# Patient Record
Sex: FEMALE | Race: WHITE | NOT HISPANIC OR LATINO | Employment: UNEMPLOYED | URBAN - METROPOLITAN AREA
[De-identification: names, ages, dates, MRNs, and addresses within clinical notes are randomized per-mention and may not be internally consistent; named-entity substitution may affect disease eponyms.]

---

## 2021-05-26 ENCOUNTER — EVALUATION (OUTPATIENT)
Dept: PHYSICAL THERAPY | Facility: CLINIC | Age: 26
End: 2021-05-26
Payer: COMMERCIAL

## 2021-05-26 DIAGNOSIS — M76.62 TENDONITIS, ACHILLES, LEFT: ICD-10-CM

## 2021-05-26 DIAGNOSIS — M79.672 LEFT FOOT PAIN: Primary | ICD-10-CM

## 2021-05-26 DIAGNOSIS — M72.2 PLANTAR FASCIITIS OF LEFT FOOT: ICD-10-CM

## 2021-05-26 PROCEDURE — 97163 PT EVAL HIGH COMPLEX 45 MIN: CPT | Performed by: PHYSICAL THERAPIST

## 2021-05-26 NOTE — PROGRESS NOTES
PT Evaluation     Today's date: 2021  Patient name: Veto Mattson  : 1995  MRN: [id number]  Referring provider: Luz Conley DPM  Dx:   Encounter Diagnosis     ICD-10-CM    1  Left foot pain  M79 672    2  Plantar fasciitis of left foot  M72 2    3  Tendonitis, Achilles, left  M76 62        Start Time: 1315  Stop Time: 1400  Total time in clinic (min): 45 minutes    Assessment  Assessment details: Patient is a 31 y o  female who presents to physical therapy with physician diagnosis of Left foot pain  (primary encounter diagnosis) Plantar fasciitis of left foot  Tendonitis, Achilles, left  PT contacted referring physician to discuss evaluation findings and will hold until after specialist consultation or further imaging has been performed  Subjective Evaluation    History of Present Illness  Mechanism of injury: Patient presents to PT wearing a walking boot on the left foot after being diagnosed with plantar fascitis and achilles tendonitis  She received an injection at the end of April but symptoms worsened  MRI was ordered and indicated revealed tendonitis  Currently she is toe walking and is not able to ambulate without there boot  She is currently employed at [employer] but has not been working due to the pandemic  Recurrent probem    Quality of life: good    Pain  Current pain ratin  At best pain ratin  At worst pain ratin          Objective     General Comments: Ankle/Foot Comments   (-) Ligamentous stress tests  (-) ttp heel, achilles tendon and plantar fascia  AROM/PROM WNL  Toe touch weight bearing only  Lacking 20-30 knee extension with painful end feel  Pain at end rage knee flexion  ttp joint line  No observable effusion of the knee  Unable to assess LLE neural tenison  No special tests performed   Hip A/PROM WNL  (-) hip scowers test               Precautions: down syndrome, portal vein DVT chronic, celiac disease, hypothyroidism   Gall bladder removal at age 14        Manuals                                                                 Neuro Re-Ed                                                                                                        Ther Ex                                                                                                                     Ther Activity                                       Gait Training                                       Modalities

## 2021-05-26 NOTE — LETTER
May 27, 2021    Jonyia Anila Miller [address] [place] [id number]    Patient: Rochelle Medrano   YOB: 1995   Date of Visit: 2021     Encounter Diagnosis     ICD-10-CM    1  Left foot pain  M79 672    2  Plantar fasciitis of left foot  M72 2    3  Tendonitis, Achilles, left  M76 62        Dear Dr Devorah Marrero: Thank you for your recent referral of Rochelle Medrano  Please review the attached evaluation summary from Anne's recent visit  Please verify that you agree with the plan of care by signing the attached order  If you have any questions or concerns, please do not hesitate to call  I sincerely appreciate the opportunity to share in the care of one of your patients and hope to have another opportunity to work with you in the near future  Sincerely,    Lindsay Brennan, PT      Referring Provider:      I certify that I have read the below Plan of Care and certify the need for these services furnished under this plan of treatment while under my care  Anila Chavez [address]  Via Fax: [phone]          PT Evaluation     Today's date: 2021  Patient name: Rochelle Medrano  : 1995  MRN: [id number]  Referring provider: Paula Lawson DPM  Dx:   Encounter Diagnosis     ICD-10-CM    1  Foot pain, bilateral  M79 671     [id number]                   Assessment  Assessment details: Patient is a 31 y o  female who presents to physical therapy with physician diagnosis of Left foot pain  (primary encounter diagnosis) Plantar fasciitis of left foot  Tendonitis, Achilles, left  PT contacted referring physician to discuss evaluation findings and will hold until after specialist consultation or further imaging has been performed          Subjective Evaluation    History of Present Illness  Mechanism of injury: Patient presents to PT wearing a walking boot on the left foot after being diagnosed with plantar fascitis and achilles tendonitis  She received an injection at the end of April but symptoms worsened  MRI was ordered and indicated revealed tendonitis  Currently she is toe walking and is not able to ambulate without there boot  She is currently employed at [employer] but has not been working due to the pandemic  Recurrent probem    Quality of life: good    Pain  Current pain ratin  At best pain ratin  At worst pain ratin          Objective     General Comments: Ankle/Foot Comments   (-) Ligamentous stress tests  (-) ttp heel, achilles tendon and plantar fascia  AROM/PROM WNL  Toe touch weight bearing only  Lacking 20-30 knee extension with painful end feel  Pain at end rage knee flexion  ttp joint line  No observable effusion of the knee  Unable to assess LLE neural tenison  No special tests performed   Hip A/PROM WNL  (-) hip scowers test               Precautions: down syndrome, portal vein DVT chronic, celiac disease, hypothyroidism  Gall bladder removal at age 14        Manuals                                                                 Neuro Re-Ed                                                                                                        Ther Ex                                                                                                                     Ther Activity                                       Gait Training                                       Modalities

## 2021-05-27 ENCOUNTER — TRANSCRIBE ORDERS (OUTPATIENT)
Dept: PHYSICAL THERAPY | Facility: CLINIC | Age: 26
End: 2021-05-27

## 2021-05-27 DIAGNOSIS — M76.62 TENDONITIS, ACHILLES, LEFT: ICD-10-CM

## 2021-05-27 DIAGNOSIS — M79.672 LEFT FOOT PAIN: Primary | ICD-10-CM

## 2021-05-27 DIAGNOSIS — M72.2 PLANTAR FASCIAL FIBROMATOSIS: ICD-10-CM

## 2021-06-07 ENCOUNTER — APPOINTMENT (OUTPATIENT)
Dept: RADIOLOGY | Facility: MEDICAL CENTER | Age: 26
End: 2021-06-07
Payer: COMMERCIAL

## 2021-06-07 ENCOUNTER — OFFICE VISIT (OUTPATIENT)
Dept: OBGYN CLINIC | Facility: MEDICAL CENTER | Age: 26
End: 2021-06-07
Payer: COMMERCIAL

## 2021-06-07 VITALS
TEMPERATURE: 98 F | SYSTOLIC BLOOD PRESSURE: 109 MMHG | HEART RATE: 86 BPM | BODY MASS INDEX: 40.13 KG/M2 | WEIGHT: 186 LBS | DIASTOLIC BLOOD PRESSURE: 71 MMHG | HEIGHT: 57 IN

## 2021-06-07 DIAGNOSIS — M25.562 LEFT KNEE PAIN, UNSPECIFIED CHRONICITY: ICD-10-CM

## 2021-06-07 DIAGNOSIS — M25.562 LEFT KNEE PAIN, UNSPECIFIED CHRONICITY: Primary | ICD-10-CM

## 2021-06-07 DIAGNOSIS — M89.9 BONE LESION: ICD-10-CM

## 2021-06-07 PROCEDURE — 73562 X-RAY EXAM OF KNEE 3: CPT

## 2021-06-07 PROCEDURE — 99203 OFFICE O/P NEW LOW 30 MIN: CPT | Performed by: ORTHOPAEDIC SURGERY

## 2021-06-07 NOTE — PROGRESS NOTES
Assessment/Plan     1  Left knee pain, unspecified chronicity    2  Bone lesion      Orders Placed This Encounter   Procedures    XR knee 3 vw left non injury    MRI knee left  wo contrast     · Patient has irregularity of the cortex left proximal tibia   · Will be ordering MRI left knee to further evaluate this  · Ice as needed for pain   · May take Tylenol 1000 mg every 8 hours as needed for pain  Do not exceed 3000 mg a day   Return for Discuss MRI left knee   I answered all of the patient's questions during the visit and provided education of the patient's condition during the visit  The patient verbalized understanding of the information given and agrees with the plan  This note was dictated using MEDOP software  It may contain errors including improperly dictated words  Please contact physician directly for any questions  History of Present Illness   Chief complaint:   Chief Complaint   Patient presents with    Left Knee - Pain       HPI: Santhosh Taylor is a 31 y o  female that c/o left knee pain  She was referred by her podiatrist Dr Bernice Ventura  She is present today with mother  Patient has history of Down syndrome  Per her mother she has been treating with a podiatrist the last two months for left plantar fasciitis and had steroid injection, was wearing a Cam boot and was referred to physical therapy  During her physical therapy sessions, the physical therapist noticed  she had trouble extending her left knee  Patient does limp when walking and has trouble standing  Pain is over the anteromedial aspect of the left knee with twisting  Per her mother she had fall a week ago  Patient is currently not taking pain medications  Patient cannot take NSAIDs due to being on Eliquis for blood clots  It is she is using a cane when walking  She has no history having injections, physical therapy or surgeries on the left knee  ROS:    See HPI for musculoskeletal review     All other systems reviewed are negative     Historical Information   No past medical history on file  No past surgical history on file  Social History   Social History     Substance and Sexual Activity   Alcohol Use Not on file     Social History     Substance and Sexual Activity   Drug Use Not on file     Social History     Tobacco Use   Smoking Status Never Smoker   Smokeless Tobacco Never Used     Family History: No family history on file  No current outpatient medications on file prior to visit  No current facility-administered medications on file prior to visit  Not on File    No current outpatient medications on file prior to visit  No current facility-administered medications on file prior to visit  Objective   Vitals: Blood pressure 109/71, pulse 86, temperature 98 °F (36 7 °C), height 4' 9" (1 448 m), weight 84 4 kg (186 lb)  ,Body mass index is 40 25 kg/m²      PE:  AAOx 3  WDWN  Hearing intact, no drainage from eyes  Regular rate  no audible wheezing  no abdominal distension  LE compartments soft, skin intact    leftknee:    Appearance:  no swelling   No ecchymosis  no obvious joint deformity   No effusion  Palpation/Tenderness:  No TTP over medial joint line  No TTP over lateral joint line   No TTP over patella  No TTP over patellar tendon  No TTP over pes anserine bursa  Active Range of Motion:  PROM:   Special Tests:  Medial Adolfo's Test:  Unable to exam   Lateral Adolfo's Test:  Unable to exam   Apley's compression test:  Negative  Lachman's Test:  Unable to exam   Anterior Drawer Test:  Negative  Patellar grind:  Negative  Valgus Stress Test:  Negative at 50 degrees   Varus Stress Test:  negative at  50 degrees     No ipsilateral hip pain with ROM    leftLE:    Sensation grossly intact L4, S1   Palpable posterior tibial  pulse  AT/GS  intact    Imaging Studies: I have personally reviewed pertinent films in PACS   XR leftknee:  Irregularity noted on the lateral cortex of the proximal tibia        Scribe Attestation    I,:  Yash Guevara am acting as a scribe while in the presence of the attending physician :       I,:  Piper Amador, DO personally performed the services described in this documentation    as scribed in my presence :

## 2021-06-15 ENCOUNTER — HOSPITAL ENCOUNTER (OUTPATIENT)
Dept: MRI IMAGING | Facility: HOSPITAL | Age: 26
Discharge: HOME | End: 2021-06-15
Attending: ORTHOPAEDIC SURGERY

## 2021-06-15 DIAGNOSIS — M89.9 BONE LESION: ICD-10-CM

## 2021-06-15 DIAGNOSIS — M25.562 LEFT KNEE PAIN, UNSPECIFIED CHRONICITY: ICD-10-CM

## 2021-06-16 ENCOUNTER — TELEPHONE (OUTPATIENT)
Dept: OBGYN CLINIC | Facility: HOSPITAL | Age: 26
End: 2021-06-16

## 2021-06-16 DIAGNOSIS — M25.562 LEFT KNEE PAIN, UNSPECIFIED CHRONICITY: Primary | ICD-10-CM

## 2021-06-16 DIAGNOSIS — M89.9 BONE LESION: ICD-10-CM

## 2021-06-16 NOTE — TELEPHONE ENCOUNTER
Johan Kothari,     Can you please call the patients mother to reschedule her at the locations offering the larger MRI machine  Thank you!

## 2021-06-16 NOTE — TELEPHONE ENCOUNTER
Patients mother [place] states that her MRI is scheduled for [facility]  Can you please authorize this?       NPI# [id number]  # [phone]

## 2021-06-16 NOTE — TELEPHONE ENCOUNTER
Patient sees Dr Willson Roles  Patients mother Mary Brooks is calling in stating that she was scheduled to have an MRI yesterday but was unable to do it  She stated that her leg was too swollen for them to close the enclosure they need to get her leg in  They advised that Cleo Adler or Abi Ramírez might be able to have a machine that would be able to help her  Mom is asking if anyway they are able to get this done as soon as possible, and for a call back relating this          Call back# [phone]

## 2021-06-17 ENCOUNTER — TELEPHONE (OUTPATIENT)
Dept: OBGYN CLINIC | Facility: HOSPITAL | Age: 26
End: 2021-06-17

## 2021-06-17 ENCOUNTER — HOSPITAL ENCOUNTER (OUTPATIENT)
Dept: MRI IMAGING | Facility: HOSPITAL | Age: 26
Discharge: HOME | End: 2021-06-17
Payer: COMMERCIAL

## 2021-06-17 DIAGNOSIS — M25.562 LEFT KNEE PAIN, UNSPECIFIED CHRONICITY: ICD-10-CM

## 2021-06-17 DIAGNOSIS — M89.9 BONE LESION: ICD-10-CM

## 2021-06-17 PROCEDURE — 73721 MRI JNT OF LWR EXTRE W/O DYE: CPT

## 2021-06-17 PROCEDURE — G1004 CDSM NDSC: HCPCS

## 2021-06-17 NOTE — TELEPHONE ENCOUNTER
Patient sees Dr Bailey Wright    Patient had an MRI scheduled for today and it was unsuccessful  Patient was in pain at every attempt to fully extend her leg, making the patient unable to complete the MRI  Patient's mother called to see what Dr Bailey Wright would recommend they do now      Call back # [phone]

## 2021-06-17 NOTE — TELEPHONE ENCOUNTER
Patient's mom called stating she doesn't know what to do now  She stated that the MRI was failed again  She stated that the patient was in a lot of pain and could not sit still  She is asking what is next  She is asking if she needs to keep the appointment for Monday even though the MRI wasn't done just to come in and speak to someone about next steps  Please advise

## 2021-06-17 NOTE — TELEPHONE ENCOUNTER
MRI called as well to let Dr Charmaine Ma that they were only able to get 2 scans out of 6 and they are very blurry and undiagnosable due to patient movement due to pain    Please advise

## 2021-06-18 NOTE — TELEPHONE ENCOUNTER
Spoke with patient's mother  She explained that Brayan Case was unable to tolerate MRI  I let her know that Dr Cash Dumont is out of the office until Monday  We will review Anne's case and come up with a plan for her and call on Monday morning  Patient does have an appointment on Monday  I will keep this scheduled for now, but likely Dr Cash Dumont will not need to see Brayan Dick without imaging to review yet

## 2021-06-21 DIAGNOSIS — M89.9 BONE LESION: ICD-10-CM

## 2021-06-21 DIAGNOSIS — M25.562 ACUTE PAIN OF LEFT KNEE: Primary | ICD-10-CM

## 2021-06-21 NOTE — TELEPHONE ENCOUNTER
MRI order placed to complete with sedation  Explained to [facility], patient's mother, that this will have to be done at [facility] so they can have anesthesia present  Order placed  We will follow up once results are in

## 2021-06-21 NOTE — TELEPHONE ENCOUNTER
Spoke with patients mother  Appt for 6/21 cancelled  We will discuss further imaging and call patients mother to update her

## 2021-06-23 ENCOUNTER — ANESTHESIA EVENT (OUTPATIENT)
Dept: RADIOLOGY | Facility: HOSPITAL | Age: 26
End: 2021-06-23

## 2021-06-23 RX ORDER — LEVOTHYROXINE SODIUM 0.12 MG/1
125 TABLET ORAL DAILY
COMMUNITY
Start: 2021-05-26

## 2021-06-23 RX ORDER — SPIRONOLACTONE 50 MG/1
50 TABLET, FILM COATED ORAL DAILY
COMMUNITY

## 2021-06-23 RX ORDER — IVERMECTIN 10 MG/G
CREAM TOPICAL
COMMUNITY

## 2021-06-23 RX ORDER — CETIRIZINE HYDROCHLORIDE 10 MG/1
10 TABLET ORAL DAILY
COMMUNITY

## 2021-06-23 RX ORDER — APIXABAN 2.5 MG/1
2.5 TABLET, FILM COATED ORAL 2 TIMES DAILY
COMMUNITY
Start: 2021-05-21

## 2021-06-23 RX ORDER — AZELASTINE HCL 205.5 UG/1
SPRAY NASAL
COMMUNITY

## 2021-06-23 RX ORDER — CLINDAMYCIN PHOSPHATE 10 UG/ML
LOTION TOPICAL
COMMUNITY

## 2021-06-23 RX ORDER — PANTOPRAZOLE SODIUM 40 MG/1
40 TABLET, DELAYED RELEASE ORAL 2 TIMES DAILY
COMMUNITY
Start: 2021-05-21

## 2021-06-23 RX ORDER — FERROUS SULFATE TAB EC 324 MG (65 MG FE EQUIVALENT) 324 (65 FE) MG
TABLET DELAYED RESPONSE ORAL
COMMUNITY

## 2021-06-23 RX ORDER — ADAPALENE 0.1 G/100G
1 CREAM TOPICAL
COMMUNITY
Start: 2021-05-24

## 2021-06-23 RX ORDER — TRIAMCINOLONE ACETONIDE 1 MG/G
CREAM TOPICAL 2 TIMES DAILY
COMMUNITY

## 2021-06-23 NOTE — PRE-PROCEDURE INSTRUCTIONS
Pre-Surgery Instructions:   Medication Instructions    adapalene (DIFFERIN) 0 1 % cream Instructed patient per Anesthesia Guidelines   Azelastine HCl 0 15 % SOLN Instructed patient per Anesthesia Guidelines   cetirizine (ZyrTEC) 10 mg tablet Instructed patient per Anesthesia Guidelines   clindamycin (CLEOCIN T) 1 % lotion Instructed patient per Anesthesia Guidelines   Doxycycline Hyclate 120 MG TBEC Instructed patient per Anesthesia Guidelines   Eliquis 2 5 MG Instructed patient per Anesthesia Guidelines   ferrous sulfate 324 (65 Fe) mg Instructed patient per Anesthesia Guidelines   Ivermectin (Soolantra) 1 % CREA Instructed patient per Anesthesia Guidelines   levothyroxine 125 mcg tablet Instructed patient per Anesthesia Guidelines   pantoprazole (PROTONIX) 40 mg tablet Instructed patient per Anesthesia Guidelines   spironolactone (ALDACTONE) 50 mg tablet Instructed patient per Anesthesia Guidelines  HOLD 6/28    triamcinolone (KENALOG) 0 1 % cream Instructed patient per Anesthesia Guidelines  Pre Procedure instructions given and verbalized understanding to mother  Hold Aldactone 6/28  Morning meds with water

## 2021-06-28 ENCOUNTER — TELEPHONE (OUTPATIENT)
Dept: OBGYN CLINIC | Facility: MEDICAL CENTER | Age: 26
End: 2021-06-28

## 2021-06-28 ENCOUNTER — ANESTHESIA (OUTPATIENT)
Dept: RADIOLOGY | Facility: HOSPITAL | Age: 26
End: 2021-06-28

## 2021-06-28 ENCOUNTER — HOSPITAL ENCOUNTER (OUTPATIENT)
Dept: RADIOLOGY | Facility: HOSPITAL | Age: 26
Discharge: HOME | End: 2021-06-28
Payer: COMMERCIAL

## 2021-06-28 VITALS
HEIGHT: 57 IN | TEMPERATURE: 96.9 F | WEIGHT: 180 LBS | OXYGEN SATURATION: 97 % | RESPIRATION RATE: 16 BRPM | HEART RATE: 66 BPM | DIASTOLIC BLOOD PRESSURE: 59 MMHG | BODY MASS INDEX: 38.83 KG/M2 | SYSTOLIC BLOOD PRESSURE: 107 MMHG

## 2021-06-28 DIAGNOSIS — M25.662 STIFFNESS OF LEFT KNEE: Primary | ICD-10-CM

## 2021-06-28 DIAGNOSIS — M89.9 BONE LESION: ICD-10-CM

## 2021-06-28 DIAGNOSIS — M25.562 ACUTE PAIN OF LEFT KNEE: ICD-10-CM

## 2021-06-28 LAB
EXT PREGNANCY TEST URINE: NEGATIVE
EXT. CONTROL: NORMAL

## 2021-06-28 PROCEDURE — 81025 URINE PREGNANCY TEST: CPT | Performed by: ANESTHESIOLOGY

## 2021-06-28 PROCEDURE — G1004 CDSM NDSC: HCPCS

## 2021-06-28 PROCEDURE — 73721 MRI JNT OF LWR EXTRE W/O DYE: CPT

## 2021-06-28 RX ORDER — SODIUM CHLORIDE, SODIUM LACTATE, POTASSIUM CHLORIDE, CALCIUM CHLORIDE 600; 310; 30; 20 MG/100ML; MG/100ML; MG/100ML; MG/100ML
20 INJECTION, SOLUTION INTRAVENOUS CONTINUOUS
Status: DISCONTINUED | OUTPATIENT
Start: 2021-06-28 | End: 2021-06-29 | Stop reason: HOSPADM

## 2021-06-28 RX ORDER — ONDANSETRON 2 MG/ML
INJECTION INTRAMUSCULAR; INTRAVENOUS AS NEEDED
Status: DISCONTINUED | OUTPATIENT
Start: 2021-06-28 | End: 2021-06-28

## 2021-06-28 RX ORDER — LIDOCAINE HYDROCHLORIDE 10 MG/ML
0.5 INJECTION, SOLUTION EPIDURAL; INFILTRATION; INTRACAUDAL; PERINEURAL ONCE AS NEEDED
Status: DISCONTINUED | OUTPATIENT
Start: 2021-06-28 | End: 2021-06-29 | Stop reason: HOSPADM

## 2021-06-28 RX ORDER — LIDOCAINE HYDROCHLORIDE 10 MG/ML
INJECTION, SOLUTION EPIDURAL; INFILTRATION; INTRACAUDAL; PERINEURAL AS NEEDED
Status: DISCONTINUED | OUTPATIENT
Start: 2021-06-28 | End: 2021-06-28

## 2021-06-28 RX ORDER — DEXAMETHASONE SODIUM PHOSPHATE 10 MG/ML
INJECTION, SOLUTION INTRAMUSCULAR; INTRAVENOUS AS NEEDED
Status: DISCONTINUED | OUTPATIENT
Start: 2021-06-28 | End: 2021-06-28

## 2021-06-28 RX ORDER — EPHEDRINE SULFATE 50 MG/ML
INJECTION INTRAVENOUS AS NEEDED
Status: DISCONTINUED | OUTPATIENT
Start: 2021-06-28 | End: 2021-06-28

## 2021-06-28 RX ORDER — SODIUM CHLORIDE, SODIUM LACTATE, POTASSIUM CHLORIDE, CALCIUM CHLORIDE 600; 310; 30; 20 MG/100ML; MG/100ML; MG/100ML; MG/100ML
INJECTION, SOLUTION INTRAVENOUS CONTINUOUS PRN
Status: DISCONTINUED | OUTPATIENT
Start: 2021-06-28 | End: 2021-06-28

## 2021-06-28 RX ORDER — PROPOFOL 10 MG/ML
INJECTION, EMULSION INTRAVENOUS AS NEEDED
Status: DISCONTINUED | OUTPATIENT
Start: 2021-06-28 | End: 2021-06-28

## 2021-06-28 RX ADMIN — ONDANSETRON 4 MG: 2 INJECTION INTRAMUSCULAR; INTRAVENOUS at 12:51

## 2021-06-28 RX ADMIN — SODIUM CHLORIDE, SODIUM LACTATE, POTASSIUM CHLORIDE, AND CALCIUM CHLORIDE 20 ML/HR: .6; .31; .03; .02 INJECTION, SOLUTION INTRAVENOUS at 11:50

## 2021-06-28 RX ADMIN — DEXAMETHASONE SODIUM PHOSPHATE 10 MG: 10 INJECTION, SOLUTION INTRAMUSCULAR; INTRAVENOUS at 12:51

## 2021-06-28 RX ADMIN — PROPOFOL 150 MG: 10 INJECTION, EMULSION INTRAVENOUS at 12:45

## 2021-06-28 RX ADMIN — SODIUM CHLORIDE, SODIUM LACTATE, POTASSIUM CHLORIDE, AND CALCIUM CHLORIDE: .6; .31; .03; .02 INJECTION, SOLUTION INTRAVENOUS at 12:54

## 2021-06-28 RX ADMIN — LIDOCAINE HYDROCHLORIDE 50 MG: 10 INJECTION, SOLUTION EPIDURAL; INFILTRATION; INTRACAUDAL; PERINEURAL at 12:45

## 2021-06-28 RX ADMIN — EPHEDRINE SULFATE 5 MG: 50 INJECTION, SOLUTION INTRAVENOUS at 13:12

## 2021-06-28 RX ADMIN — EPHEDRINE SULFATE 5 MG: 50 INJECTION, SOLUTION INTRAVENOUS at 12:54

## 2021-06-28 NOTE — ANESTHESIA PREPROCEDURE EVALUATION
Procedure:  MRI KNEE LEFT WO CONTRAST    Relevant Problems   No relevant active problems   Hx of Downs syndrome   ASD s/p repair            Anesthesia Plan  ASA Score- 3     Anesthesia Type- general with ASA Monitors  Additional Monitors:   Airway Plan: LMA  Plan Factors-Exercise tolerance (METS): >4 METS  Chart reviewed  EKG reviewed  Imaging results reviewed  Existing labs reviewed  Patient summary reviewed  Induction- intravenous  Postoperative Plan- Plan for postoperative opioid use  Planned trial extubation    Informed Consent- Anesthetic plan and risks discussed with patient  I personally reviewed this patient with the CRNA  Discussed and agreed on the Anesthesia Plan with the CRNA  Gopi Rojas

## 2021-06-28 NOTE — NURSING NOTE
MRI completed, patient tolerated procedure  Post vital signs taken and recorded  Report given to  [facility]  Patient placed in transport to be taken to [address]  Patient offers no complaints or verbalizing any issues upon leaving MRI with Mother at side

## 2021-06-28 NOTE — TELEPHONE ENCOUNTER
Spoke with patient's mother Woodward Dancer  MRI was normal, with small joint effusion  Patient may have developed arthrofibrosis due to previous injury  We recommend PT and following up with Dr Slime Blank in several weeks  New PT script provided  Referral placed for No Fletcher had no other questions at this time

## 2021-06-28 NOTE — ANESTHESIA POSTPROCEDURE EVALUATION
Post-Op Assessment Note    CV Status:  Stable  Pain Score: 0    Pain management: adequate     Mental Status:  Alert   Hydration Status:  Stable   PONV Controlled:  None   Airway Patency:  Patent      Post Op Vitals Reviewed: Yes      Staff: CRNA         No complications documented      BP   116/64   Temp   97 6   Pulse  68   Resp   14   SpO2   98%

## 2021-06-30 ENCOUNTER — EVALUATION (OUTPATIENT)
Dept: PHYSICAL THERAPY | Facility: CLINIC | Age: 26
End: 2021-06-30
Payer: COMMERCIAL

## 2021-06-30 DIAGNOSIS — M25.562 ACUTE PAIN OF LEFT KNEE: ICD-10-CM

## 2021-06-30 DIAGNOSIS — M25.662 STIFFNESS OF LEFT KNEE: Primary | ICD-10-CM

## 2021-06-30 PROCEDURE — 97110 THERAPEUTIC EXERCISES: CPT | Performed by: PHYSICAL THERAPIST

## 2021-06-30 PROCEDURE — 97140 MANUAL THERAPY 1/> REGIONS: CPT | Performed by: PHYSICAL THERAPIST

## 2021-06-30 PROCEDURE — 97162 PT EVAL MOD COMPLEX 30 MIN: CPT | Performed by: PHYSICAL THERAPIST

## 2021-06-30 NOTE — PROGRESS NOTES
PT Evaluation     Today's date: 2021  Patient name: Radha Andrea  : 1995  MRN: [id number]  Referring provider: Adria Wong PA-C  Dx:   Encounter Diagnosis     ICD-10-CM    1  Stiffness of left knee  M25 662 Ambulatory referral to Physical Therapy   2  Acute pain of left knee  M25 562 Ambulatory referral to Physical Therapy                  Assessment  Assessment details: Radha Andrea is a 31y o  year old female presenting to PT with decreased range of motion, decreased strength, and decreased tolerance to activity  The existing impairments result in difficulty weight bearing on the LLE  She was found with Achilles tendonitis on imaging, related to forefoot weightbearing  Knee MRI revealed no abnormalities  She is noted with increased tone throughout hamstring and gastroc today  She is able to increase knee extension following MT and is noted with near flat foot weight bearing by end of session  Lloyd Galvez would benefit from skilled PT services to address these issues and to maximize function  Home exercise provided and all questions answered  Thank you for the referral     Impairments: abnormal or restricted ROM, activity intolerance and impaired physical strength  Understanding of Dx/Px/POC: good   Prognosis: good    Plan  Planned modality interventions: thermotherapy: hydrocollator packs  Planned therapy interventions: joint mobilization, manual therapy, neuromuscular re-education, strengthening, stretching, therapeutic activities and therapeutic exercise  Frequency: 2x week  Duration in weeks: 6        Subjective Evaluation    History of Present Illness  Mechanism of injury: Lloyd Galvez returns to clinic with her mother for follow up evaluation for left knee pain  MRI revealed no abnormalities in the knee  She continues to rely on [employer] for support while walking due to feeling that the knee might buckle on her  Swelling has largely resolved    Patient reports decrease in feelings of discomfort around the left knee     Pain  Relieving factors: rest and support  Aggravating factors: walking  Progression: improved      Diagnostic Tests  MRI studies: normal  Treatments  Current treatment: physical therapy  Patient Goals  Patient goals for therapy: decreased pain, increased motion, return to sport/leisure activities, independence with ADLs/IADLs, increased strength and return to work          Objective     Active Range of Motion   Left Knee   Flexion: 108 degrees   Extension: -33 degrees     Strength/Myotome Testing     Left Knee   Flexion: 4+  Extension: 4+    No tenderness to p         Precautions: Down's Syndrome      Manuals 6/30            Prone achilles STM JL                                                   Neuro Re-Ed                                                                                                        Ther Ex             Bike L1 5'            Bridge x5            SLR 5x            Heel slides 5x                                                                Ther Activity                                       Gait Training                                       Modalities

## 2021-07-06 ENCOUNTER — OFFICE VISIT (OUTPATIENT)
Dept: PHYSICAL THERAPY | Facility: CLINIC | Age: 26
End: 2021-07-06
Payer: COMMERCIAL

## 2021-07-06 DIAGNOSIS — M25.562 ACUTE PAIN OF LEFT KNEE: ICD-10-CM

## 2021-07-06 DIAGNOSIS — M25.662 STIFFNESS OF LEFT KNEE: Primary | ICD-10-CM

## 2021-07-06 PROCEDURE — 97112 NEUROMUSCULAR REEDUCATION: CPT | Performed by: PHYSICAL THERAPIST

## 2021-07-06 PROCEDURE — 97140 MANUAL THERAPY 1/> REGIONS: CPT | Performed by: PHYSICAL THERAPIST

## 2021-07-06 PROCEDURE — 97110 THERAPEUTIC EXERCISES: CPT | Performed by: PHYSICAL THERAPIST

## 2021-07-06 NOTE — PROGRESS NOTES
Daily Note     Today's date: 2021  Patient name: Pineda Hathaway  : 1995  MRN: [id number]  Referring provider: Ebony Cuellar PA-C  Dx:   Encounter Diagnosis     ICD-10-CM    1  Stiffness of left knee  M25 662    2  Acute pain of left knee  M25 562                   Subjective: Anne and her mother notice improved heel striking today, and decreased reliance on SPC at home  Jani Saldivar notices that her knee feels better while barefoot  Objective: See treatment diary below      Assessment: Tolerated treatment well and continues to benefit from MT, with continued focus on Achilles and new focus on hip rotators today  Patient exhibited good technique with therapeutic exercises and would benefit from continued PT      Plan: Continue per plan of care  Progress treatment as tolerated         Precautions: Down's Syndrome      Manuals            Prone achilles STM JL JL           Prone HS and piriformis  JL                                     Neuro Re-Ed             Jyoti  Green 2x10           HS elongation  10"x10           Supine piriformis s  30"x3                                                               Ther Ex             Bike L1 5' L1 10'           Bridge x5            SLR 5x            Heel slides 5x                                                                Ther Activity                                       Gait Training                                       Modalities

## 2021-07-08 ENCOUNTER — OFFICE VISIT (OUTPATIENT)
Dept: PHYSICAL THERAPY | Facility: CLINIC | Age: 26
End: 2021-07-08
Payer: COMMERCIAL

## 2021-07-08 DIAGNOSIS — M25.662 STIFFNESS OF LEFT KNEE: Primary | ICD-10-CM

## 2021-07-08 DIAGNOSIS — M25.562 ACUTE PAIN OF LEFT KNEE: ICD-10-CM

## 2021-07-08 PROCEDURE — 97140 MANUAL THERAPY 1/> REGIONS: CPT

## 2021-07-08 PROCEDURE — 97112 NEUROMUSCULAR REEDUCATION: CPT

## 2021-07-08 PROCEDURE — 97110 THERAPEUTIC EXERCISES: CPT

## 2021-07-08 NOTE — PROGRESS NOTES
Daily Note     Today's date: 2021  Patient name: Guillermina David  : 1995  MRN: [id number]  Referring provider: Jenny Perdomo PA-C  Dx:   Encounter Diagnosis     ICD-10-CM    1  Stiffness of left knee  M25 662    2  Acute pain of left knee  M25 562                   Subjective: Anne and her mother continue to report improvement with walking and decreased reliance on SPC at home  Shanda Hockey denies pain pre treatment  Objective: See treatment diary below      Assessment: Tolerated treatment well  Pt cont's to respond well to manual therapy with improved knee extension ROM and gait post treatment  Good tolerance to exercise program with verbal/tactile cues provided for proper technique  Patient exhibited good technique with therapeutic exercises and would benefit from continued PT      Plan: Continue per plan of care  Progress treatment as tolerated         Precautions: Down's Syndrome      Manuals           Prone achilles STM JL JL GH          Prone HS and piriformis  JL GH                                    Neuro Re-Ed             Clamshell  Green 2x10 Green 2x10 s/l          HS elongation  10"x10 10"x  10          Supine piriformis stretch  30"x3 30"x3                                                              Ther Ex             Bike L1 5' L1 10' L1 6'          Bridge x5  5"x10          SLR 5x            Heel slides 5x                                                                Ther Activity                                       Gait Training                                       Modalities

## 2021-07-13 ENCOUNTER — OFFICE VISIT (OUTPATIENT)
Dept: PHYSICAL THERAPY | Facility: CLINIC | Age: 26
End: 2021-07-13
Payer: COMMERCIAL

## 2021-07-13 DIAGNOSIS — M25.662 STIFFNESS OF LEFT KNEE: Primary | ICD-10-CM

## 2021-07-13 DIAGNOSIS — M25.562 ACUTE PAIN OF LEFT KNEE: ICD-10-CM

## 2021-07-13 PROCEDURE — 97112 NEUROMUSCULAR REEDUCATION: CPT

## 2021-07-13 PROCEDURE — 97140 MANUAL THERAPY 1/> REGIONS: CPT

## 2021-07-13 PROCEDURE — 97110 THERAPEUTIC EXERCISES: CPT

## 2021-07-13 NOTE — PROGRESS NOTES
Daily Note     Today's date: 2021  Patient name: Rosario Tracey  : 1995  MRN: [id number]  Referring provider: Betty Resendiz PA-C  Dx:   Encounter Diagnosis     ICD-10-CM    1  Stiffness of left knee  M25 662    2  Acute pain of left knee  M25 562                   Subjective: Jessa Umanzor and her mother continue to report slow improvement with her walking  Jessa Umanzor denies pain  Objective: See treatment diary below      Assessment: Tolerated treatment well  Good tolerance to progression of exercise with verbal/tactile cues provided for proper technique  Pt cont's to respond well to manual therapy and exercise with improved knee extension and heel strike with gait post treatment  Patient exhibited good technique with therapeutic exercises and would benefit from continued PT      Plan: Continue per plan of care  Progress treatment as tolerated         Precautions: Down's Syndrome      Manuals          Prone achilles STM JL JL GH GH         Prone HS and piriformis  JL GH GH                                    Neuro Re-Ed             Clamshell  Green 2x10 Green 2x10 s/l nv         HS elongation  10"x10 10"x  10 GH         Supine piriformis stretch  30"x3 30"x3 [facility]         Standing gastroc stretch/knee ext with 1/2 foam roll    10"x5         Exaggerated gait at railing (emphasize heel strike)     20 ft x2                                    Ther Ex             Bike L1 5' L1 10' L1 6' 7'         Bridge x5  5"x10          SLR 5x            Heel slides 5x            LAQ     x10 with tc's                                                Ther Activity                                       Gait Training                                       Modalities

## 2021-07-15 ENCOUNTER — OFFICE VISIT (OUTPATIENT)
Dept: PHYSICAL THERAPY | Facility: CLINIC | Age: 26
End: 2021-07-15
Payer: COMMERCIAL

## 2021-07-15 DIAGNOSIS — M25.662 STIFFNESS OF LEFT KNEE: Primary | ICD-10-CM

## 2021-07-15 DIAGNOSIS — M25.562 ACUTE PAIN OF LEFT KNEE: ICD-10-CM

## 2021-07-15 PROCEDURE — 97110 THERAPEUTIC EXERCISES: CPT | Performed by: PHYSICAL THERAPIST

## 2021-07-15 PROCEDURE — 97112 NEUROMUSCULAR REEDUCATION: CPT | Performed by: PHYSICAL THERAPIST

## 2021-07-15 PROCEDURE — 97140 MANUAL THERAPY 1/> REGIONS: CPT | Performed by: PHYSICAL THERAPIST

## 2021-07-16 NOTE — PROGRESS NOTES
Daily Note     Today's date: 2021  Patient name: Terri Cole  : 1995  MRN: [id number]  Referring provider: Bassam Dang PA-C  Dx:   Encounter Diagnosis     ICD-10-CM    1  Stiffness of left knee  M25 662    2  Acute pain of left knee  M25 562                   Subjective: Anne and her mom note improvement in knee mobility  She is relying on the [facility] less at home  Objective: See treatment diary below      Assessment: Tolerated treatment fair and noted with spacticity in the left hamstring with knee extension  The plan to follow up with PCP regarding this finding    Patient demonstrated fatigue post treatment and would benefit from continued PT      Plan: Continue per plan of care  Progress treatment as tolerated         Precautions: Down's Syndrome      Manuals 6/30 7/6 7/8 7/13 7/15        Prone achilles STM JL JL GH GH JL        Prone HS and piriformis  JL GH GH  JL                                  Neuro Re-Ed             Clamshell  Green 2x10 Green 2x10 s/l nv         HS elongation  10"x10 10"x  10 GH JL        Supine piriformis stretch  30"x3 30"x3 [address] JL        Standing gastroc stretch/knee ext with 1/2 foam roll    10"x5         Exaggerated gait at railing (emphasize heel strike)     20 ft x2                                    Ther Ex             Bike L1 5' L1 10' L1 6' 7' 6'        Bridge x5  5"x10  JL        SLR 5x            Heel slides 5x            LAQ     x10 with tc's         TM inc walk     5'                                  Ther Activity                                       Gait Training                                       Modalities

## 2021-07-20 ENCOUNTER — OFFICE VISIT (OUTPATIENT)
Dept: PHYSICAL THERAPY | Facility: CLINIC | Age: 26
End: 2021-07-20
Payer: COMMERCIAL

## 2021-07-20 DIAGNOSIS — M25.562 ACUTE PAIN OF LEFT KNEE: ICD-10-CM

## 2021-07-20 DIAGNOSIS — M25.662 STIFFNESS OF LEFT KNEE: Primary | ICD-10-CM

## 2021-07-20 PROCEDURE — 97140 MANUAL THERAPY 1/> REGIONS: CPT | Performed by: PHYSICAL THERAPIST

## 2021-07-20 PROCEDURE — 97112 NEUROMUSCULAR REEDUCATION: CPT | Performed by: PHYSICAL THERAPIST

## 2021-07-20 PROCEDURE — 97110 THERAPEUTIC EXERCISES: CPT | Performed by: PHYSICAL THERAPIST

## 2021-07-22 ENCOUNTER — OFFICE VISIT (OUTPATIENT)
Dept: PHYSICAL THERAPY | Facility: CLINIC | Age: 26
End: 2021-07-22
Payer: COMMERCIAL

## 2021-07-22 DIAGNOSIS — M25.562 ACUTE PAIN OF LEFT KNEE: ICD-10-CM

## 2021-07-22 DIAGNOSIS — M25.662 STIFFNESS OF LEFT KNEE: Primary | ICD-10-CM

## 2021-07-22 PROCEDURE — 97110 THERAPEUTIC EXERCISES: CPT | Performed by: PHYSICAL THERAPIST

## 2021-07-22 PROCEDURE — 97140 MANUAL THERAPY 1/> REGIONS: CPT | Performed by: PHYSICAL THERAPIST

## 2021-07-22 PROCEDURE — 97112 NEUROMUSCULAR REEDUCATION: CPT | Performed by: PHYSICAL THERAPIST

## 2021-07-22 NOTE — PROGRESS NOTES
Daily Note     Today's date: 2021  Patient name: Jad Marie  : 1995  MRN: [id number]  Referring provider: Oralia Castro PA-C  Dx:   Encounter Diagnosis     ICD-10-CM    1  Stiffness of left knee  M25 662    2  Acute pain of left knee  M25 562                   Subjective: Brando Turner continues to work consistently on her home exercises      Objective: See treatment diary below      Assessment: Tolerated treatment well and TCs benefiticial to target PPT movement in supine  Less cuing required in seated and quadruped  Patient exhibited good technique with therapeutic exercises and would benefit from continued PT      Plan: Continue per plan of care        Precautions: Down's Syndrome      Manuals 6/30 7/6 7/8 7/13 7/15 7/20       Prone achilles STM JL JL GH GH JL JL       Prone HS and piriformis  JL GH GH  JL JL                                 Neuro Re-Ed             PPT supine      15x       Cat/camel      15x       Seated PPT      15x                                 Clamshell  Green 2x10 Green 2x10 s/l nv         HS elongation  10"x10 10"x  10 GH JL seated       Supine piriformis stretch  30"x3 30"x3 GH JL        Standing gastroc stretch/knee ext with 1/2 foam roll    10"x5         Exaggerated gait at railing (emphasize heel strike)     20 ft x2                                    Ther Ex             Bike L1 5' L1 10' L1 6' 7' 6' 6'       Bridge x5  5"x10  JL        SLR 5x            Heel slides 5x            LAQ     x10 with tc's         TM inc walk     5' 5'       PB rolls forward      15x                    Ther Activity                                       Gait Training                                       Modalities

## 2021-07-22 NOTE — PROGRESS NOTES
Daily Note     Today's date: 2021  Patient name: Pineda Hathaway  : 1995  MRN: [id number]  Referring provider: Ebony Cuellar PA-C  Dx:   Encounter Diagnosis     ICD-10-CM    1  Stiffness of left knee  M25 662    2  Acute pain of left knee  M25 562                   Subjective[facility] core activation at [facility]  She was able to walk much farther than usual at a baseball game this week  Objective: See treatment diary below      Assessment: Tolerated treatment well and continues to benefit from core activation and from addition of leg press for single limb strengthening  Patient exhibited good technique with therapeutic exercises and would benefit from continued PT      Plan: Continue per plan of care  Progress treatment as tolerated         Precautions: Down's Syndrome      Manuals 6/30 7/6 7/8 7/13 7/15 7/20 7/22      Prone achilles STM JL JL GH GH JL JL JL      Prone HS and piriformis  JL GH GH  JL JL JL                                Neuro Re-Ed             PPT supine      15x       Cat/camel      15x 15      Seated PPT      15x       PB abd press       15                   Clamshell  Green 2x10 Green 2x10 s/l nv         HS elongation  10"x10 10"x  10 GH JL seated [id number]      Supine piriformis stretch  30"x3 30"x3 GH JL        Standing gastroc stretch/knee ext with 1/2 foam roll    10"x5         Exaggerated gait at railing (emphasize heel strike)     20 ft x2                                    Ther Ex             Bike L1 5' L1 10' L1 6' 7' 6' 6' 6'      Bridge x5  5"x10  JL        SLR 5x            Heel slides 5x            LAQ     x10 with tc's         TM inc walk     5' 5' 5'      PB rolls forward      15x 15x      SL leg press       20# 20x      Ther Activity                                       Gait Training                                       Modalities

## 2021-07-27 ENCOUNTER — OFFICE VISIT (OUTPATIENT)
Dept: PHYSICAL THERAPY | Facility: CLINIC | Age: 26
End: 2021-07-27
Payer: COMMERCIAL

## 2021-07-27 DIAGNOSIS — M25.562 ACUTE PAIN OF LEFT KNEE: ICD-10-CM

## 2021-07-27 DIAGNOSIS — M25.662 STIFFNESS OF LEFT KNEE: Primary | ICD-10-CM

## 2021-07-27 PROCEDURE — 97110 THERAPEUTIC EXERCISES: CPT | Performed by: PHYSICAL THERAPIST

## 2021-07-27 PROCEDURE — 97140 MANUAL THERAPY 1/> REGIONS: CPT | Performed by: PHYSICAL THERAPIST

## 2021-07-27 NOTE — PROGRESS NOTES
Daily Note     Today's date: 2021  Patient name: Familia Cisneros  : 1995  MRN: [id number]  Referring provider: Travis Canas PA-C  Dx:   Encounter Diagnosis     ICD-10-CM    1  Stiffness of left knee  M25 662    2  Acute pain of left knee  M25 562                   Subjective: Delia Maddie is able to identify pain specifically at the left patella tendon      Objective: See treatment diary below      Assessment: Tolerated treatment well and she is observed with minimal weight bearing on the LLE,  which is improved with visual feedback on biodex today  Continued focus on quad strengthening, correction of lumbar lordosis and hamstring stretching today  Encouraged weight shifting to the left while standing at home and transitioning SPC to the left hand to cue the same    Patient exhibited good technique with therapeutic exercises and would benefit from continued PT      Plan: Continue per plan of care  Progress treatment as tolerated         Precautions: Down's Syndrome      Manuals 6/30 7/6 7/8 7/13 7/15 7/20 7/22 7/27    Prone achilles STM JL JL GH GH JL JL JL     Prone HS and piriformis  JL GH GH  JL JL JL                             Neuro Re-Ed            PPT supine      15x      Cat/camel      15x 15 10"x10    Seated PPT      15x      PB abd press       15 5"  20x                Clamshell  Green 2x10 Green 2x10 s/l nv        HS elongation  10"x10 10"x  10 GH JL seated [id number] Seated 12x    Supine piriformis stretch  30"x3 30"x3 GH JL       Standing gastroc stretch/knee ext with 1/2 foam roll    10"x5        Exaggerated gait at railing (emphasize heel strike)     20 ft x2         Biodex weight shift        50/50 x10                Ther Ex            Bike L1 5' L1 10' L1 6' 7' 6' 6' 6' 6'    Bridge x5  5"x10  JL   Single 2x5 ea    SLR 5x           Heel slides 5x           LAQ     x10 with tc's        TM inc walk     5' 5' 5' 5'    PB rolls forward      15x 15x np    SL leg press       20# 20x 20# 2x10 Liss HOYOS        20# 5"x10    Ther Activity                                    Gait Training                                    Modalities

## 2021-07-29 ENCOUNTER — OFFICE VISIT (OUTPATIENT)
Dept: PHYSICAL THERAPY | Facility: CLINIC | Age: 26
End: 2021-07-29
Payer: COMMERCIAL

## 2021-07-29 DIAGNOSIS — M25.662 STIFFNESS OF LEFT KNEE: Primary | ICD-10-CM

## 2021-07-29 DIAGNOSIS — M25.562 ACUTE PAIN OF LEFT KNEE: ICD-10-CM

## 2021-07-29 PROCEDURE — 97110 THERAPEUTIC EXERCISES: CPT | Performed by: PHYSICAL THERAPIST

## 2021-07-29 PROCEDURE — 97112 NEUROMUSCULAR REEDUCATION: CPT | Performed by: PHYSICAL THERAPIST

## 2021-07-29 NOTE — PROGRESS NOTES
Daily Note     Today's date: 2021  Patient name: Gabriele Newell  : 1995  MRN: [id number]  Referring provider: Jose C Benedict PA-C  Dx:   Encounter Diagnosis     ICD-10-CM    1  Stiffness of left knee  M25 662    2  Acute pain of left knee  M25 562                   Subjective: Sanjuana John has been working on [employer] consistently and ha even noticed hamstring stretching sensation, which she has had difficulty communicating in the past        Objective: See treatment diary below      Assessment: Tolerated treatment well and improved ability to weight shift to the left today - no knee bucking observed  She achieves greater knee extension during seated elongation than at any previous visit today    Patient exhibited good technique with therapeutic exercises and would benefit from continued PT      Plan: Continue per plan of care  Progress treatment as tolerated         Precautions: Down's Syndrome      Manuals 6/30 7/6 7/8 7/13 7/15 7/20 7/22 7/27 7/29   Prone achilles STM JL JL GH GH JL JL JL     Prone HS and piriformis  JL GH GH  JL JL JL                             Neuro Re-Ed            PPT supine      15x      Cat/camel      15x 15 10"x10 10"x10   Seated PPT      15x      PB abd press       15 5"  20x 5" x30               Clamshell  Green 2x10 Green 2x10 s/l nv        HS elongation  10"x10 10"x  10 GH JL seated [id number] Seated 12x Seated x12   Supine piriformis stretch  30"x3 30"x3 GH JL       Standing gastroc stretch/knee ext with 1/2 foam roll    10"x5        Exaggerated gait at railing (emphasize heel strike)     20 ft x2         Biodex weight shift Goal >25%       50/50 x10 50/50 with 20% accuracy 10'               Ther Ex            Bike L1 5' L1 10' L1 6' 7' 6' 6' 6' 6' 8'   Bridge x5  5"x10  JL   Single 2x5 ea Single 3x8   SLR 5x           Heel slides 5x           LAQ     x10 with tc's        TM inc walk     5' 5' 5' 5' 3'   PB rolls forward      15x 15x np    SL leg press       20# 20x 20# 2x10 20# 2x10   Liss HOYOS        20# 5"x10 17# 2x10   Ther Activity                                    Gait Training                                    Modalities

## 2021-08-09 ENCOUNTER — OFFICE VISIT (OUTPATIENT)
Dept: OBGYN CLINIC | Facility: MEDICAL CENTER | Age: 26
End: 2021-08-09
Payer: COMMERCIAL

## 2021-08-09 VITALS
DIASTOLIC BLOOD PRESSURE: 71 MMHG | HEART RATE: 94 BPM | WEIGHT: 180 LBS | HEIGHT: 57 IN | BODY MASS INDEX: 38.83 KG/M2 | SYSTOLIC BLOOD PRESSURE: 101 MMHG

## 2021-08-09 DIAGNOSIS — M25.662 STIFFNESS OF LEFT KNEE: ICD-10-CM

## 2021-08-09 DIAGNOSIS — M25.562 ACUTE PAIN OF LEFT KNEE: ICD-10-CM

## 2021-08-09 PROCEDURE — 99213 OFFICE O/P EST LOW 20 MIN: CPT | Performed by: ORTHOPAEDIC SURGERY

## 2021-08-09 NOTE — PROGRESS NOTES
Ortho Sports Medicine Knee New Patient Visit     Assesment:   31 y o  female left knee effusions with possible arthrofibrosis   Versus overly activated and tight hamstrings    Plan:    Conservative treatment:    Recommended at this time for her to proceed forward with seeing a physiatrist and the rheumatologist       Recommended that she continue go to physical therapy to work on quad strengthening  Instructed that is difficult to tell if the patient has true arthrofibrosis or if she is preventing extension on exam     Imaging: All imaging from today was reviewed by myself and explained to the patient  Injection:    No Injection planned at this time  Surgery:     No surgery is recommended at this point, continue with conservative treatment plan as noted  Instructed that    Follow up:    Return in about 2 months (around 10/9/2021), or if symptoms worsen or fail to improve  Chief Complaint   Patient presents with    Left Knee - Pain       History of Present Illness: The patient is a 31 y o  female whose occupation is disabled, referred to me by Dr Kulkarni regarding left knee limited extension  History obtained from mother due to history of down syndrome and poor historian  Pain is located anterior  The patient rates the pain as a  unknown/10  The pain has been present for 5 months  The  Mother does not believe that the patient sustained an injury  Mother states that a few months ago patient started to walk on her toes with left lower extremity and she believes that the patient developed plantar fasciitis which she has had in the past and would act this way then  Mother states that they saw all the podiatrist who believed that the patient also had plantar fasciitis and administered a cortisone injection that did not improve  Patient then went to physical therapist in the therapist believed that problem was actually coming from the knee and not the foot    Patient was then seen by Dr Whitley Nj who ordered MRI imaging that did not show any turn all derangement but recurrent effusions  Patient has limited extension  Patient presents today with continued abnormal gait and limited extension  Mother is unsure patient is experiencing pain  Mother does states that the patient does have a history of autoimmune disorders and will base the a rheumatologist   A mother denies this happening to the patient in the past   She denies any becoming overly swollen hot and red  She denies having any other joints  Pain is improved by rest   Pain is aggravated by stairs, squatting, weight bearing and walking  Symptoms include locking  The patient has tried rest, ice, NSAIDS and physical therapy            Knee Surgical History:  None    Past Medical, Social and Family History:  Past Medical History:   Diagnosis Date    Blood clot in vein     portal,super mesecteric, splenic veins chronic    Celiac disease     Disease of thyroid gland     hypothyroid    Down syndrome     GERD (gastroesophageal reflux disease)     Seizures (HCC)      Past Surgical History:   Procedure Laterality Date    CHOLECYSTECTOMY      CYST REMOVAL      back    MYRINGOTOMY W/ TUBES       Allergies   Allergen Reactions    Cefzil [Cefprozil] Rash     childhood     Current Outpatient Medications on File Prior to Visit   Medication Sig Dispense Refill    adapalene (DIFFERIN) 0 1 % cream Apply 1 application topically daily at bedtime Apply sparingly to affected area(s)      Azelastine HCl 0 15 % SOLN       cetirizine (ZyrTEC) 10 mg tablet Take 10 mg by mouth daily      clindamycin (CLEOCIN T) 1 % lotion       Doxycycline Hyclate 120 MG TBEC Take 1 tablet by mouth      Eliquis 2 5 MG Take 2 5 mg by mouth 2 (two) times a day      ferrous sulfate 324 (65 Fe) mg       Ivermectin (Soolantra) 1 % CREA       levothyroxine 125 mcg tablet Take 125 mcg by mouth daily      pantoprazole (PROTONIX) 40 mg tablet Take 40 mg by mouth 2 (two) times a day      spironolactone (ALDACTONE) 50 mg tablet Take 50 mg by mouth daily      triamcinolone (KENALOG) 0 1 % cream Apply topically 2 (two) times a day       No current facility-administered medications on file prior to visit  Social History     Socioeconomic History    Marital status: Single     Spouse name: Not on file    Number of children: Not on file    Years of education: Not on file    Highest education level: Not on file   Occupational History    Not on file   Tobacco Use    Smoking status: Never Smoker    Smokeless tobacco: Never Used   Substance and Sexual Activity    Alcohol use: Not on file    Drug use: Not on file    Sexual activity: Not on file   Other Topics Concern    Not on file   Social History Narrative    Not on file     Social Determinants of Health     Financial Resource Strain:     Difficulty of Paying Living Expenses:    Food Insecurity:     Worried About Running Out of Food in the Last Year:     [address] in the Last Year:    Transportation Needs:     Lack of Transportation (Medical):  Lack of Transportation (Non-Medical):    Physical Activity:     Days of Exercise per Week:     Minutes of Exercise per Session:    Stress:     Feeling of Stress :    Social Connections:     Frequency of Communication with Friends and Family:     Frequency of Social Gatherings with Friends and Family:     Attends Church Services:     Active Member of Clubs or Organizations:     Attends Club or Organization Meetings:     Marital Status:    Intimate Partner Violence:     Fear of Current or Ex-Partner:     Emotionally Abused:     Physically Abused:     Sexually Abused:          I have reviewed the past medical, surgical, social and family history, medications and allergies as documented in the EMR  Review of systems: ROS is negative other than that noted in the HPI  Constitutional: Negative for fatigue and fever     HENT: Negative for sore throat  Respiratory: Negative for shortness of breath  Cardiovascular: Negative for chest pain  Gastrointestinal: Negative for abdominal pain  Endocrine: Negative for cold intolerance and heat intolerance  Genitourinary: Negative for flank pain  Musculoskeletal: Negative for back pain  Skin: Negative for rash  Allergic/Immunologic: Negative for immunocompromised state  Neurological: Negative for dizziness  Psychiatric/Behavioral: Negative for agitation  Physical Exam:    Blood pressure 101/71, pulse 94, height 4' 9" (1 448 m), weight 81 6 kg (180 lb)  General/Constitutional: NAD, well developed, well nourished  HENT: Normocephalic, atraumatic  CV: Intact distal pulses, regular rate  Resp: No respiratory distress or labored breathing  Lymphatic: No lymphadenopathy palpated  Neuro: Alert and Oriented x 3, no focal deficits  Psych: Normal mood, normal affect, normal judgement, normal behavior  Skin: Warm, dry, no rashes, no erythema      Knee Exam (focused): RIGHT LEFT   ROM:   -5-130  active and passive   Palpation: Effusion negative mild     MJL tenderness Negative Negative     LJL tenderness Negative Negative   Meniscus:  Adolfo Negative Negative    Apley's Compression Negative Negative   Instability: Varus stable stable     Valgus stable stable   Special Tests: Lachman Negative Negative     Posterior drawer Negative Negative     Anterior drawer Negative Negative     Pivot shift not tested not tested     Dial not tested not tested   Patella: Palpation no tenderness no tenderness     Mobility 1/4 1/4     Apprehension Negative Negative   Other: Single leg 1/4 squat not tested not tested      LE NV Exam: +2 DP/PT pulses bilaterally  Sensation intact to light touch L2-S1 bilaterally     Bilateral hip ROM demonstrates no pain actively or passively    No calf tenderness to palpation bilaterally    Knee Imaging    MRI of the left knee were reviewed, which demonstrate a large effusion without ligamentous injury  I have reviewed the radiology report and agree with their impression

## 2021-08-10 ENCOUNTER — OFFICE VISIT (OUTPATIENT)
Dept: PHYSICAL THERAPY | Facility: CLINIC | Age: 26
End: 2021-08-10
Payer: COMMERCIAL

## 2021-08-10 DIAGNOSIS — M25.562 ACUTE PAIN OF LEFT KNEE: ICD-10-CM

## 2021-08-10 DIAGNOSIS — M25.662 STIFFNESS OF LEFT KNEE: Primary | ICD-10-CM

## 2021-08-10 PROCEDURE — 97112 NEUROMUSCULAR REEDUCATION: CPT

## 2021-08-10 PROCEDURE — 97110 THERAPEUTIC EXERCISES: CPT

## 2021-08-10 NOTE — PROGRESS NOTES
Daily Note     Today's date: 8/10/2021  Patient name: Debbie Kong  : 1995  MRN: [id number]  Referring provider: Elizabeth Arenas PA-C  Dx:   Encounter Diagnosis     ICD-10-CM    1  Stiffness of left knee  M25 662    2  Acute pain of left knee  M25 562                   Subjective: Anne's mother reports having ortho Dr appt yesterday, scheduled appt with physiatrist tomorrow and rheumatologist at end of September  Pt compliant with HEP  No new c/o's  Objective: See treatment diary below      Assessment: Tolerated treatment well  Knee extension slowly improving with seated active elongation  Left  [address] slowly improving with verbal cues  Moderate LE muscle fatigue noted with exercise but no c/o pain  Patient exhibited good technique with therapeutic exercises and would benefit from continued PT      Plan: Continue per plan of care  Progress treatment as tolerated         Precautions: Down's Syndrome      Manuals 8/10 7/6 7/8 7/13 7/15 7/20 7/22 7/27 7/29   Prone achilles STM  JL GH GH JL JL JL     Prone HS and piriformis  JL GH GH  JL JL JL                             Neuro Re-Ed            PPT supine      15x      Cat/camel 10"x10     15x 15 10"x10 10"x10   Quadriped LE ext  2x5 each           Seated PPT      15x      PB abd press 5"x30      15 5"  20x 5" x30               Clamshell  Green 2x10 Green 2x10 s/l nv        HS elongation seated 10"x12 10"x10 10"x  10 GH JL seated [id number] Seated 12x Seated x12   Supine piriformis stretch  30"x3 30"x3 GH JL       Standing gastroc stretch/knee ext with 1/2 foam roll    10"x5        Exaggerated gait at railing (emphasize heel strike)     20 ft x2         Biodex weight shift Goal >25%  50/50 8'       50/50 x10 50/50 with 20% accuracy 10'               Ther Ex            Bike 8' L1 10' L1 6' 7' 6' 6' 6' 6' 8'   Bridge   5"x10  JL   Single 2x5 ea Single 3x8   SLR            Heel slides            LAQ     x10 with tc's        TM inc walk 4' 2% 1 0 mph    5' 5' 5' 5' 3'   PB rolls forward      15x 15x np    SL leg press 20# 2x10      20# 20x 20# 2x10 20# 2x10   [place] TKE 17# 2x10       20# 5"x10 17# 2x10   Ther Activity                                    Gait Training                                    Modalities

## 2021-08-12 ENCOUNTER — EVALUATION (OUTPATIENT)
Dept: PHYSICAL THERAPY | Facility: CLINIC | Age: 26
End: 2021-08-12
Payer: COMMERCIAL

## 2021-08-12 DIAGNOSIS — M25.662 STIFFNESS OF LEFT KNEE: Primary | ICD-10-CM

## 2021-08-12 DIAGNOSIS — M25.562 ACUTE PAIN OF LEFT KNEE: ICD-10-CM

## 2021-08-12 PROCEDURE — 97110 THERAPEUTIC EXERCISES: CPT | Performed by: PHYSICAL THERAPIST

## 2021-08-12 PROCEDURE — 97164 PT RE-EVAL EST PLAN CARE: CPT | Performed by: PHYSICAL THERAPIST

## 2021-08-12 PROCEDURE — 97112 NEUROMUSCULAR REEDUCATION: CPT | Performed by: PHYSICAL THERAPIST

## 2021-08-12 NOTE — PROGRESS NOTES
Re-Evaluation     Today's date: 2021  Patient name: Juan Rivas  : 1995  MRN: [id number]  Referring provider: Didier Wong PA-C  Dx:   Encounter Diagnosis     ICD-10-CM    1  Stiffness of left knee  M25 662    2  Acute pain of left knee  M25 562                   Assessment  Assessment details: Denisse Serrano has been seen for 12 visits since beginning PT on 21  States 25% improvement overall  Discussed progress with mother, feels she is demonstrating mild improvements in gait overall since beginning PT  Notices less toe walking but Sissys gait continues to be crouched and demonstrates left lower extremity weakness and hamstring tightness  Significant compensation present with ambulation, demonstrating excessive lumbar lordosis, circumduction of hips, and knee flexion  Denies pain in left knee/lower extremity  Significant weakness remains present with weight-bearing on left side limiting her ability to have an efficient gait pattern  Continues to ambulate with single point cane  FOTO score decreased from 50 at IE to 40 at time of re-evaluation  Denisse Serrano would benefit from skilled PT services to address these issues and to maximize function  Impairments: abnormal or restricted ROM, activity intolerance and impaired physical strength    Understanding of Dx/Px/POC: good   Prognosis: good     Plan  Planned modality interventions: thermotherapy: hydrocollator packs  Planned therapy interventions: joint mobilization, manual therapy, neuromuscular re-education, strengthening, stretching, therapeutic activities and therapeutic exercise  Frequency: 2x week  Duration in weeks: 6         Subjective Evaluation     History of Present Illness  PER PT IE on 21:  Mechanism of injury: Denisse Serrano returns to clinic with her mother for follow up evaluation for left knee pain  MRI revealed no abnormalities in the knee    She continues to rely on [facility] for support while walking due to feeling that the knee might buckle on her  Swelling has largely resolved  Patient reports decrease in feelings of discomfort around the left knee       Pain  Relieving factors: rest and support  Aggravating factors: walking  Progression: improved        Diagnostic Tests  MRI studies: normal  Treatments  Current treatment: physical therapy  Patient Goals  Patient goals for therapy: decreased pain, increased motion, return to sport/leisure activities, independence with ADLs/IADLs, increased strength and return to work           Objective      Active Range of Motion   Left Knee   Flexion: 108 degrees   Extension: -23* (improved from -33 degrees at IE)     Strength/Myotome Testing      Left Knee   Flexion: 4+  Extension: 4+     No tenderness to p        Precautions: Down's Syndrome      Manuals 8/10 8/12 7/8 7/13 7/15 7/20 7/22 7/27 7/29   Prone achilles STM   GH GH JL JL JL     Prone HS and piriformis   GH   JL JL JL                             Neuro Re-Ed            PPT supine  --    15x      Cat/camel 10"x10 :10x10    15x 15 10"x10 10"x10   Quadriped LE ext  2x5 each 1x5 each          Seated PPT  --    15x      PB abd press 5"x30 :05x20     15 5"  20x 5" x30               Clamshell  -- Green 2x10 s/l nv        HS elongation seated 10"x12 seated 10"x12 10"x  10 GH JL seated [id number] Seated 12x Seated x12   Supine piriformis stretch  -- 30"x3 GH JL       Standing gastroc stretch/knee ext with 1/2 foam roll  --  10"x5        Exaggerated gait at railing (emphasize heel strike)   --  20 ft x2         Biodex weight shift Goal >25%  50/50 8' Goal >25%  50/50 8'      50/50 x10 50/50 with 20% accuracy 10'               Ther Ex            Bike 8' 8' L1 6' 7' 6' 6' 6' 6' 8'   Bridge   5"x10  JL   Single 2x5 ea Single 3x8   SLR  --          Heel slides  --          LAQ   --  x10 with tc's        TM inc walk 4' 2% 1 0 mph 4' 2% 1 0 mph   5' 5' 5' 5' 3'   PB rolls forward  --    15x 15x np    SL leg press 20# 2x10 20# 2x10     20# 20x 20# 2x10 20# 2x10   Liss HOYOS 17# 2x10  17# 2x10      20# 5"x10 17# 2x10   Ther Activity                                    Gait Training                                    Modalities

## 2021-08-16 ENCOUNTER — OFFICE VISIT (OUTPATIENT)
Dept: PHYSICAL THERAPY | Facility: CLINIC | Age: 26
End: 2021-08-16
Payer: COMMERCIAL

## 2021-08-16 DIAGNOSIS — M25.662 STIFFNESS OF LEFT KNEE: Primary | ICD-10-CM

## 2021-08-16 DIAGNOSIS — M25.562 ACUTE PAIN OF LEFT KNEE: ICD-10-CM

## 2021-08-16 PROCEDURE — 97112 NEUROMUSCULAR REEDUCATION: CPT

## 2021-08-16 PROCEDURE — 97110 THERAPEUTIC EXERCISES: CPT

## 2021-08-16 NOTE — PROGRESS NOTES
Daily Note     Today's date: 2021  Patient name: Roseanna Bryant  : 1995  MRN: [id number]  Referring provider: Donna Garcia PA-C  Dx:   Encounter Diagnosis     ICD-10-CM    1  Stiffness of left knee  M25 662    2  Acute pain of left knee  M25 562                   Subjective:  No new c/o's  Pt compliant with HEP  Objective: See treatment diary below  Assessment: Tolerated treatment well  Pt cont's to demonstrate slow improvement with left LE WB'ing and gait  Good tolerance to exercise with muscle fatigue noted but no c/o pain  Verbal/tactile cues provided with exercise for proper technique  Patient would benefit from continued PT      Plan: Continue per plan of care        Precautions: Down's Syndrome      Manuals 8/10 8/12 8/16 7/13 7/15 7/20 7/22 7/27 7/29   Prone achilles STM    GH JL JL JL     Prone HS and piriformis    GH  JL JL JL                             Neuro Re-Ed            PPT supine  --    15x      Cat/camel 10"x10 :10x10 home   15x 15 10"x10 10"x10   Quadriped LE ext  2x5 each 1x5 each 2x5 ea         Seated PPT  --    15x      PB abd press 5"x30 :05x20 5"x20    15 5"  20x 5" x30               Clamshell  --  nv        HS elongation seated 10"x12 seated 10"x12 [id number]"x  12 GH JL seated [id number] Seated 12x Seated x12   Supine piriformis stretch  --  GH JL       Standing gastroc stretch/knee ext with 1/2 foam roll  --  10"x5        Exaggerated gait at railing (emphasize heel strike)   --  20 ft x2         Biodex weight shift Goal >25%  50/50 8' Goal >25%  50/50 8' Goal >30% 50/50 8'     50/50 x10 50/50 with 20% accuracy 10'               Ther Ex            Bike 8' 8' 8' L1 7' 6' 6' 6' 6' 8'   Bridge     JL   Single 2x5 ea Single 3x8   SLR  --          Heel slides  --          LAQ   --  x10 with tc's        TM inc walk 4' 2% 1 0 mph 4' 2% 1 0 mph 5' 1 1 mph 2%  5' 5' 5' 5' 3'   PB rolls forward  --    15x 15x np    SL leg press 20# 2x10 20# 2x10 20# 2x10    20# 20x 20# 2x10 20# 2x10   Liss HOYOS 17# 2x10  17# 2x10 17#   2x10     20# 5"x10 17# 2x10   Ther Activity                                    Gait Training                                    Modalities

## 2021-08-18 ENCOUNTER — OFFICE VISIT (OUTPATIENT)
Dept: PHYSICAL THERAPY | Facility: CLINIC | Age: 26
End: 2021-08-18
Payer: COMMERCIAL

## 2021-08-18 DIAGNOSIS — M25.662 STIFFNESS OF LEFT KNEE: Primary | ICD-10-CM

## 2021-08-18 DIAGNOSIS — M25.562 ACUTE PAIN OF LEFT KNEE: ICD-10-CM

## 2021-08-18 PROCEDURE — 97112 NEUROMUSCULAR REEDUCATION: CPT | Performed by: PHYSICAL THERAPIST

## 2021-08-18 PROCEDURE — 97140 MANUAL THERAPY 1/> REGIONS: CPT | Performed by: PHYSICAL THERAPIST

## 2021-08-18 PROCEDURE — 97110 THERAPEUTIC EXERCISES: CPT | Performed by: PHYSICAL THERAPIST

## 2021-08-18 NOTE — PROGRESS NOTES
Daily Note     Today's date: 2021  Patient name: Tanner Alanis  : 1995  MRN: [id number]  Referring provider: Germán Doty PA-C  Dx:   Encounter Diagnosis     ICD-10-CM    1  Stiffness of left knee  M25 662    2  Acute pain of left knee  M25 562                   Subjective: Anne and mother note continued improvement in knee/hamstring mobility  Objective: See treatment diary below      Assessment: Tolerated treatment well and difficulty maintaining lumbar posture with cervical extension in quadruped and sitting today  Additional time spent activating flattening of lumbar spine while sitting to improve posture during functional activity  Achieved -16 deg knee ext follow stretching and MT today Patient exhibited good technique with therapeutic exercises and would benefit from continued PT      Plan: Continue per plan of care  Progress treatment as tolerated         Precautions: Down's Syndrome      Manuals 8/10 8/12 8/16     7/27 7/29   Prone achilles STM            seated HS and proximal calf   10'                                 Neuro Re-Ed            PPT seated  --  5'        Cat/camel 10"x10 :10x10 home 10"x10    10"x10 10"x10   Quadriped LE ext  2x5 each 1x5 each 2x5 ea         Seated PPT  --          PB abd press 5"x30 :05x20 5"x20 5"x20    5"  20x 5" x30               Clamshell  --          HS elongation seated 10"x12 seated 10"x12 [id number]"x  12 JL    Seated 12x Seated x12   Supine piriformis stretch  --          Standing gastroc stretch/knee ext with 1/2 foam roll  --          Exaggerated gait at railing (emphasize heel strike)   --          Biodex weight shift Goal >25%  50/50 8' Goal >25%  50/50 8' Goal >30% 50/50 8' 8'    50/50 x10 50/50 with 20% accuracy 10'               Ther Ex            Bike 8' 8' 8' L1 5' L1    6' 8'   Bridge        Single 2x5 ea Single 3x8   SLR  --          Heel slides  --          LAQ   --          TM inc walk 4' 2% 1 0 mph 4' 2% 1 0 mph 5' 1 1 mph 2% nv    5' 3'   PB rolls forward  --      np    SL leg press 20# 2x10 20# 2x10 20# 2x10 nv    20# 2x10 20# 2x10   Liss TKE 17# 2x10  17# 2x10 17#   2x10 nv    20# 5"x10 17# 2x10   Ther Activity                                    Gait Training                                    Modalities

## 2021-08-24 ENCOUNTER — OFFICE VISIT (OUTPATIENT)
Dept: PHYSICAL THERAPY | Facility: CLINIC | Age: 26
End: 2021-08-24
Payer: COMMERCIAL

## 2021-08-24 DIAGNOSIS — M25.662 STIFFNESS OF LEFT KNEE: Primary | ICD-10-CM

## 2021-08-24 DIAGNOSIS — M25.562 ACUTE PAIN OF LEFT KNEE: ICD-10-CM

## 2021-08-24 PROCEDURE — 97140 MANUAL THERAPY 1/> REGIONS: CPT | Performed by: PHYSICAL THERAPIST

## 2021-08-24 PROCEDURE — 97112 NEUROMUSCULAR REEDUCATION: CPT | Performed by: PHYSICAL THERAPIST

## 2021-08-24 PROCEDURE — 97110 THERAPEUTIC EXERCISES: CPT | Performed by: PHYSICAL THERAPIST

## 2021-08-24 NOTE — PROGRESS NOTES
Daily Note     Today's date: 2021  Patient name: Josh Sellers  : 1995  MRN: [id number]  Referring provider: Sarah Dawn PA-C  Dx:   Encounter Diagnosis     ICD-10-CM    1  Stiffness of left knee  M25 662    2  Acute pain of left knee  M25 562                   Subjective: Briseida Santos continues to work on her home exercises  She is ready to attempt DC from [facility]  Objective: See treatment diary below      Assessment: Tolerated treatment well and appears to have tenderness t/o posterior knee along lateral hamstring and sciatic nerve  Communication limitations result in difficulty obtaining patient feedback  Patient exhibited good technique with therapeutic exercises and would benefit from continued PT      Plan: Continue per plan of care        Precautions: Down's Syndrome      Manuals 8/10 8/12 8/16 8/18 8/24   7/27 7/29   Prone achilles STM            seated HS and proximal calf   10'  15'                               Neuro Re-Ed            PPT seated  --  5' 5'       Cat/camel 10"x10 :10x10 home 10"x10    10"x10 10"x10   Quadriped LE ext  2x5 each 1x5 each 2x5 ea         Seated PPT  --          PB abd press 5"x30 :05x20 5"x20 5"x20 nv   5"  20x 5" x30               Clamshell  --          HS elongation seated 10"x12 seated 10"x12 [id number]"x  12 JL JL   Seated 12x Seated x12   Supine piriformis stretch  --          Standing gastroc stretch/knee ext with 1/2 foam roll  --          Exaggerated gait at railing (emphasize heel strike)   --          Biodex weight shift Goal >25%  50/50 8' Goal >25%  50/50 8' Goal >30% 50/50 8' 8' 5'   50/50 x10 50/50 with 20% accuracy 10'               Ther Ex            Bike 8' 8' 8' L1 5' L1 8' L1   6' 8'   Bridge        Single 2x5 ea Single 3x8   SLR  --          Heel slides  --          LAQ   --          TM inc walk 4' 2% 1 0 mph 4' 2% 1 0 mph 5' 1 1 mph 2% nv    5' 3'   PB rolls forward  --      np    SL leg press 20# 2x10 20# 2x10 20# 2x10 nv 30# 2x10   20# 2x10 20# 2x10   Liss MUNOZE 17# 2x10  17# 2x10 17#   2x10 nv 17# 2x10   20# 5"x10 17# 2x10   Ther Activity                                    Gait Training                                    Modalities

## 2021-08-26 ENCOUNTER — OFFICE VISIT (OUTPATIENT)
Dept: PHYSICAL THERAPY | Facility: CLINIC | Age: 26
End: 2021-08-26
Payer: COMMERCIAL

## 2021-08-26 DIAGNOSIS — M25.562 ACUTE PAIN OF LEFT KNEE: ICD-10-CM

## 2021-08-26 DIAGNOSIS — M25.662 STIFFNESS OF LEFT KNEE: Primary | ICD-10-CM

## 2021-08-26 PROCEDURE — 97112 NEUROMUSCULAR REEDUCATION: CPT | Performed by: PHYSICAL THERAPIST

## 2021-08-26 PROCEDURE — 97110 THERAPEUTIC EXERCISES: CPT | Performed by: PHYSICAL THERAPIST

## 2021-08-26 NOTE — PROGRESS NOTES
Daily Note     Today's date: 2021  Patient name: Guillermina David  : 1995  MRN: [id number]  Referring provider: Pricilla Lew PA-C  Dx:   Encounter Diagnosis     ICD-10-CM    1  Stiffness of left knee  M25 662    2  Acute pain of left knee  M25 562                   Subjective: Shanda Hockey was able to participate in softball batting practice last night and walk farther at an [employer] game      Objective: See treatment diary below      Assessment: Tolerated treatment well and continued to progress core strengthening today with good tolerance, VCs required  Patient demonstrated fatigue post treatment      Plan: Continue per plan of care        Precautions: Down's Syndrome      Manuals 8/10 8/12 8/16 8/18 8/24 8/26  7/27 7/29   Prone achilles STM            seated HS and proximal calf   10'  15'                               Neuro Re-Ed            PPT seated  --  5' 5' 20x      Cat/camel 10"x10 :10x10 home 10"x10    10"x10 10"x10   Quadriped LE ext  2x5 each 1x5 each 2x5 ea         Seated PPT  --          PB abd press 5"x30 :05x20 5"x20 5"x20 nv 5"x20  5"  20x 5" x30   Supine UE ext with TA bracing      GTB 20x      HS elongation seated 10"x12 seated 10"x12 [id number]"x  12 JL JL   Seated 12x Seated x12   Standing abdominal brace with PB on table      20x      Standing gastroc stretch/knee ext with 1/2 foam roll  --          Exaggerated gait at railing (emphasize heel strike)   --          Biodex weight shift Goal >25%  50/50 8' Goal >25%  50/50 8' Goal >30% 50/50 8' 8' 5' 5'  50/50 x10 50/50 with 20% accuracy 10'               Ther Ex            Bike 8' 8' 8' L1 5' L1 8' L1 8' L1  6' 8'   Bridge      Green 2x10  Single 2x5 ea Single 3x8   SLR  --          Heel slides  --          LAQ   --          TM inc walk 4' 2% 1 0 mph 4' 2% 1 0 mph 5' 1 1 mph 2% nv    5' 3'   PB rolls forward  --      np    SL leg press 20# 2x10 20# 2x10 20# 2x10 nv 30# 2x10 30# 2x10  20# 2x10 20# 2x10   Liss HOYOS 17# 2x10  17# 2x10 17#   2x10 nv 17# 2x10   20# 5"x10 17# 2x10   Ther Activity                                    Gait Training                                    Modalities

## 2021-09-08 ENCOUNTER — OFFICE VISIT (OUTPATIENT)
Dept: PHYSICAL THERAPY | Facility: CLINIC | Age: 26
End: 2021-09-08
Payer: COMMERCIAL

## 2021-09-08 DIAGNOSIS — M25.662 STIFFNESS OF LEFT KNEE: Primary | ICD-10-CM

## 2021-09-08 DIAGNOSIS — M25.562 ACUTE PAIN OF LEFT KNEE: ICD-10-CM

## 2021-09-08 PROCEDURE — 97112 NEUROMUSCULAR REEDUCATION: CPT | Performed by: PHYSICAL THERAPIST

## 2021-09-08 NOTE — PROGRESS NOTES
Daily Note     Today's date: 2021  Patient name: Debbie Kong  : 1995  MRN: [id number]  Referring provider: Elizabeth Arenas PA-C  Dx:   Encounter Diagnosis     ICD-10-CM    1  Stiffness of left knee  M25 662    2  Acute pain of left knee  M25 562                   Subjective: Andrea Cole is proud that her knee extension continues to improve  She was diligent to exercise daily while on vacation  Objective: See treatment diary below      Assessment: Tolerated treatment well and continued to adapt core activation and strengthening to correct lumbar lordosis    Patient exhibited good technique with therapeutic exercises and would benefit from continued PT      Plan: Continue per plan of care  Progress treatment as tolerated         Precautions: Down's Syndrome      Manuals 8/10 8/12 8/16 8/18 8/24 8/26 9/8     Prone achilles STM            seated HS and proximal calf   10'  15'                               Neuro Re-Ed            PPT seated  --  5' 5' 20x 20x     Cat/camel 10"x10 :10x10 home 10"x10        Quadriped LE ext  2x5 each 1x5 each 2x5 ea         Seated PPT  --          PB abd press 5"x30 :05x20 5"x20 5"x20 nv 5"x20 20x     Supine UE ext with TA bracing      GTB 20x GTB 20x     Supine TB anti rotation       Blue 10x ea     HS elongation seated 10"x12 seated 10"x12 [id number]"x  12 JL JL  JL     Standing abdominal brace with PB on table      20x 20x     PB quadruped hip extension  --          Exaggerated gait at railing (emphasize heel strike)   --          Biodex weight shift Goal >25%  50/50 8' Goal >25%  50/50 8' Goal >30% 50/50 8' 8' 5' 5' 5'                 Ther Ex            Bike 8' 8' 8' L1 5' L1 8' L1 8' L1 5' L2     Bridge      Green 2x10      SLR  --          Heel slides  --          LAQ   --          TM inc walk 4' 2% 1 0 mph 4' 2% 1 0 mph 5' 1 1 mph 2% nv        PB rolls forward  --          SL leg press 20# 2x10 20# 2x10 20# 2x10 nv 30# 2x10 30# 2x10 40# 2x10     [place] TKE 17# 2x10  17# 2x10 17#   2x10 nv 17# 2x10       Ther Activity                                    Gait Training                                    Modalities

## 2021-09-10 ENCOUNTER — OFFICE VISIT (OUTPATIENT)
Dept: PHYSICAL THERAPY | Facility: CLINIC | Age: 26
End: 2021-09-10
Payer: COMMERCIAL

## 2021-09-10 DIAGNOSIS — M25.562 ACUTE PAIN OF LEFT KNEE: ICD-10-CM

## 2021-09-10 DIAGNOSIS — M25.662 STIFFNESS OF LEFT KNEE: Primary | ICD-10-CM

## 2021-09-10 PROCEDURE — 97110 THERAPEUTIC EXERCISES: CPT | Performed by: PHYSICAL THERAPIST

## 2021-09-10 PROCEDURE — 97112 NEUROMUSCULAR REEDUCATION: CPT | Performed by: PHYSICAL THERAPIST

## 2021-09-10 NOTE — PROGRESS NOTES
Daily Note     Today's date: 9/10/2021  Patient name: Alaina Bravo  : 1995  MRN: [id number]  Referring provider: Shakir Villanueva PA-C  Dx:   Encounter Diagnosis     ICD-10-CM    1  Stiffness of left knee  M25 662    2  Acute pain of left knee  M25 562                   Subjective: Dima Moser continues to work on knee extension and reducing lumbar lordosis      Objective: See treatment diary below      Assessment: Tolerated treatment well and continues to benefit from visual cues and feedback for core activiation   Patient would benefit from continued PT      Plan: Continue per plan of care        Precautions: Down's Syndrome      Manuals 8/10 8/12 8/16 8/18 8/24 8/26 9/8 9/10    Prone achilles STM            seated HS and proximal calf   10'  15'                               Neuro Re-Ed            PPT seated  --  5' 5' 20x 20x PB 15x    Cat/camel 10"x10 :10x10 home 10"x10        Quadriped LE ext  2x5 each 1x5 each 2x5 ea         PB abd press 5"x30 :05x20 5"x20 5"x20 nv 5"x20 20x     Supine UE ext with TA bracing      GTB 20x GTB 20x Blue 15x    Supine TB anti rotation       Blue 10x ea PB seated blue 10x ea    HS elongation seated 10"x12 seated 10"x12 [id number]"x  12 JL JL  JL     Standing abdominal brace with PB on table      20x 20x 20x    PB quadruped hip extension  --          Exaggerated gait at railing (emphasize heel strike)   --          Biodex weight shift Goal >25%  50/50 8' Goal >25%  50/50 8' Goal >30% 50/50 8' 8' 5' 5' 5' 55%, 60% JL                Ther Ex            Bike 8' 8' 8' L1 5' L1 8' L1 8' L1 5' L2 5' L2    Bridge      Green 2x10      SLR  --          Heel slides  --          LAQ   --          TM inc walk 4' 2% 1 0 mph 4' 2% 1 0 mph 5' 1 1 mph 2% nv        PB rolls forward  --          SL leg press 20# 2x10 20# 2x10 20# 2x10 nv 30# 2x10 30# 2x10 40# 2x10 40# 2x10    [place] TKE 17# 2x10  17# 2x10 17#   2x10 nv 17# 2x10       Ther Activity                                    Gait Training                                    Modalities

## 2021-09-13 ENCOUNTER — OFFICE VISIT (OUTPATIENT)
Dept: PHYSICAL THERAPY | Facility: CLINIC | Age: 26
End: 2021-09-13
Payer: COMMERCIAL

## 2021-09-13 DIAGNOSIS — M25.562 ACUTE PAIN OF LEFT KNEE: ICD-10-CM

## 2021-09-13 DIAGNOSIS — M25.662 STIFFNESS OF LEFT KNEE: Primary | ICD-10-CM

## 2021-09-13 PROCEDURE — 97110 THERAPEUTIC EXERCISES: CPT

## 2021-09-13 PROCEDURE — 97112 NEUROMUSCULAR REEDUCATION: CPT

## 2021-09-13 NOTE — PROGRESS NOTES
Daily Note     Today's date: 2021  Patient name: Wilfrid Brady  : 1995  MRN: [id number]  Referring provider: Deon Campbell PA-C  Dx:   Encounter Diagnosis     ICD-10-CM    1  Stiffness of left knee  M25 662    2  Acute pain of left knee  M25 562                   Subjective: Sunshine Llamas reports feeling good with no new c/o's  Pt compliant with HEP  Objective: See treatment diary below      Assessment: Tolerated treatment well and continues to benefit from visual cues and feedback for core activiation   Moderate LE fatigue noted post exercise  Knee extension slowly improving with WB'ing exercise  Patient would benefit from continued PT  Plan: Continue per plan of care        Precautions: Down's Syndrome      Manuals 8/10 8/12 8/16 8/18 8/24 8/26 9/8 9/10 9/13   Prone achilles STM            seated HS and proximal calf   10'  15'                               Neuro Re-Ed            PPT seated  --  5' 5' 20x 20x PB 15x PB x15   Cat/camel 10"x10 :10x10 home 10"x10        Quadriped LE ext  2x5 each 1x5 each 2x5 ea         PB abd press 5"x30 :05x20 5"x20 5"x20 nv 5"x20 20x     Supine UE ext with TA bracing      GTB 20x GTB 20x Blue 15x Blue 2x10   Supine TB anti rotation       Blue 10x ea PB seated blue 10x ea PB seated x10 ea   HS elongation seated 10"x12 seated 10"x12 [id number]"x  12 JL JL  JL     Standing abdominal brace with PB on table      20x 20x 20x GH   PB quadruped hip extension  --          Exaggerated gait at railing (emphasize heel strike)   --          Biodex weight shift Goal >25%  50/50 8' Goal >25%  50/50 8' Goal >30% 50/50 8' 8' 5' 5' 5' 55%, 60% JL 8' GH  50%               Ther Ex            Bike 8' 8' 8' L1 5' L1 8' L1 8' L1 5' L2 5' L2 5' L2   Bridge      Green 2x10      SLR  --          Heel slides  --          LAQ   --          TM inc walk 4' 2% 1 0 mph 4' 2% 1 0 mph 5' 1 1 mph 2% nv        PB rolls forward  --          SL leg press 20# 2x10 20# 2x10 20# 2x10 nv 30# 2x10 30# 2x10 40# 2x10 40# 2x10 40# 2x10   Liss MUNOZE 17# 2x10  17# 2x10 17#   2x10 nv 17# 2x10       Ther Activity                                    Gait Training                                    Modalities

## 2021-09-15 ENCOUNTER — OFFICE VISIT (OUTPATIENT)
Dept: PHYSICAL THERAPY | Facility: CLINIC | Age: 26
End: 2021-09-15
Payer: COMMERCIAL

## 2021-09-15 DIAGNOSIS — M25.562 ACUTE PAIN OF LEFT KNEE: ICD-10-CM

## 2021-09-15 DIAGNOSIS — M25.662 STIFFNESS OF LEFT KNEE: Primary | ICD-10-CM

## 2021-09-15 PROCEDURE — 97112 NEUROMUSCULAR REEDUCATION: CPT | Performed by: PHYSICAL THERAPIST

## 2021-09-15 PROCEDURE — 97110 THERAPEUTIC EXERCISES: CPT | Performed by: PHYSICAL THERAPIST

## 2021-09-15 NOTE — PROGRESS NOTES
Daily Note     Today's date: 9/15/2021  Patient name: Dcaia Zayas  : 1995  MRN: [id number]  Referring provider: Cyndee Franklin PA-C  Dx:   Encounter Diagnosis     ICD-10-CM    1  Stiffness of left knee  M25 662    2  Acute pain of left knee  M25 562                   Subjective: Patient reports no increased symptoms after lv  Mom reports she is still struggling to straighten knee fully and continues to have limitations in her endurance and her ability to to walk long distances  Mom gives her an overall 50-60% improvement thus far since starting PT  Objective: See treatment diary below      Assessment: Patient tolerated treatment well  She continues to benefit from visual cues and feedback for core activation throughout the session  She demonstrates fatigue post treatment  She is demonstrating improvements each session  She would benefit from continuing physical therapy to progress her strength, endurance, and functional abilities  Plan: Continue per plan of care        Precautions: Down's Syndrome      Manuals 8/10 8/12 8/16 8/18 8/24 8/26 9/8 9/10 9/13 9/15   Prone achilles STM             seated HS and proximal calf   10'  15'                                  Neuro Re-Ed             PPT seated  --  5' 5' 20x 20x PB 15x PB x15 PB x15   Cat/camel 10"x10 :10x10 home 10"x10         Quadriped LE ext  2x5 each 1x5 each 2x5 ea          PB abd press 5"x30 :05x20 5"x20 5"x20 nv 5"x20 20x      Supine UE ext with TA bracing      GTB 20x GTB 20x Blue 15x Blue 2x10 Blue 2x10    Supine TB anti rotation       Blue 10x ea PB seated blue 10x ea PB seated x10 ea PB seated x10 ea    HS elongation seated 10"x12 seated 10"x12 [id number]"x  12 JL JL  JL      Standing abdominal brace with PB on table      20x 20x 20x GH SK   PB quadruped hip extension  --           Exaggerated gait at railing (emphasize heel strike)   --           Biodex weight shift Goal >25%  50/50 8' Goal >25%  50/50 8' Goal >30% 50/50 8' 8' 5' 5' 5' 55%, 60% JL 8' [facility]  50% 8' SK                Ther Ex             Bike 8' 8' 8' L1 5' L1 8' L1 8' L1 5' L2 5' L2 5' L2 5' L2   Bridge      Green 2x10       SLR  --           Heel slides  --           LAQ   --           TM inc walk 4' 2% 1 0 mph 4' 2% 1 0 mph 5' 1 1 mph 2% nv         PB rolls forward  --           SL leg press 20# 2x10 20# 2x10 20# 2x10 nv 30# 2x10 30# 2x10 40# 2x10 40# 2x10 40# 2x10 40# 2x10   Liss TKE 17# 2x10  17# 2x10 17#   2x10 nv 17# 2x10        Ther Activity                                       Gait Training                                       Modalities

## 2021-09-21 ENCOUNTER — EVALUATION (OUTPATIENT)
Dept: PHYSICAL THERAPY | Facility: CLINIC | Age: 26
End: 2021-09-21
Payer: COMMERCIAL

## 2021-09-21 DIAGNOSIS — M25.562 ACUTE PAIN OF LEFT KNEE: ICD-10-CM

## 2021-09-21 DIAGNOSIS — M25.662 STIFFNESS OF LEFT KNEE: Primary | ICD-10-CM

## 2021-09-21 PROCEDURE — 97110 THERAPEUTIC EXERCISES: CPT | Performed by: PHYSICAL THERAPIST

## 2021-09-21 PROCEDURE — 97112 NEUROMUSCULAR REEDUCATION: CPT | Performed by: PHYSICAL THERAPIST

## 2021-09-21 NOTE — PROGRESS NOTES
Re-Evaluation     Today's date: 2021  Patient name: Celi Piper  : 1995  MRN: [id number]  Referring provider: Faviola Plasencia PA-C  Dx:   Encounter Diagnosis     ICD-10-CM    1  Stiffness of left knee  M25 662    2  Acute pain of left knee  M25 562                   Assessment  Assessment details: Mery Morgan has been seen for 12 visits since beginning PT on 21  States 25-50% improvement overall  Discussed progress with mother, feels she is demonstrating moderate improvements in gait overall since beginning PT  Notices less toe walking but Anne's gait continues to be crouched and demonstrates left lower extremity weakness and tightness in hamstring, erector spinae and hip flexors  Significant compensation present with ambulation, demonstrating excessive lumbar lordosis, circumduction of hips, and knee flexion  Denies pain in left knee/lower extremity  Significant weakness remains present with weight-bearing on left side limiting her ability to have an efficient gait pattern  No longer ambulating with single point cane  Meaghan Nguyen would benefit from skilled PT services to address these issues and to maximize function  Impairments: abnormal or restricted ROM, activity intolerance and impaired physical strength    Understanding of Dx/Px/POC: good   Prognosis: good     Plan  Planned modality interventions: thermotherapy: hydrocollator packs  Planned therapy interventions: joint mobilization, manual therapy, neuromuscular re-education, strengthening, stretching, therapeutic activities and therapeutic exercise  Frequency: 2x week  Duration in weeks: 6         Subjective Evaluation     History of Present Illness  PER PT IE on 21:  Mechanism of injury: Mery Morgan returns to clinic with her mother for follow up evaluation for left knee pain  MRI revealed no abnormalities in the knee  She continues to rely on [facility] for support while walking due to feeling that the knee might buckle on her  Swelling has largely resolved  Patient reports decrease in feelings of discomfort around the left knee       Pain  Relieving factors: rest and support  Aggravating factors: walking  Progression: improved        Diagnostic Tests  MRI studies: normal  Treatments  Current treatment: physical therapy  Patient Goals  Patient goals for therapy: decreased pain, increased motion, return to sport/leisure activities, independence with ADLs/IADLs, increased strength and return to work           Objective      Active Range of Motion   Left Knee   Flexion: 108 degrees   Extension: -12* (improved from -33 degrees at IE)     Strength/Myotome Testing      Left Knee   Flexion: 4+  Extension: 4+     No tenderness to p      Precautions: Down's Syndrome      Manuals 9/21     8/26 9/8 9/10 9/13 9/15   Prone achilles STM             seated HS and proximal calf                                       Neuro Re-Ed             PPT seated 20x     20x 20x PB 15x PB x15 PB x15   Cat/camel             Quadriped LE ext              PB abd press supine 20x     5"x20 20x      Supine UE ext with TA bracing      GTB 20x GTB 20x Blue 15x Blue 2x10 Blue 2x10    Supine TB anti rotation nv      Blue 10x ea PB seated blue 10x ea PB seated x10 ea PB seated x10 ea    HS elongation       JL      Standing abdominal brace with PB on table 20x     20x 20x 20x GH SK   PPT in standing with shoudlers against wall 20x            Exaggerated gait at railing (emphasize heel strike)              Biodex weight shift 8' JL  40%     5' 5' 55%, 60% JL 8' GH  50% 8' SK                Ther Ex             Bike 5'     8' L1 5' L2 5' L2 5' L2 5' L2   Bridge      Green 2x10       SLR             Heel slides             LAQ              TM inc walk             PB rolls forward             SL leg press 40# 2x10     30# 2x10 40# 2x10 40# 2x10 40# 2x10 40# 2x10   Liss TKE             Ther Activity                                       Gait Training Modalities

## 2021-09-24 ENCOUNTER — OFFICE VISIT (OUTPATIENT)
Dept: PHYSICAL THERAPY | Facility: CLINIC | Age: 26
End: 2021-09-24
Payer: COMMERCIAL

## 2021-09-24 DIAGNOSIS — M25.662 STIFFNESS OF LEFT KNEE: Primary | ICD-10-CM

## 2021-09-24 DIAGNOSIS — M25.562 ACUTE PAIN OF LEFT KNEE: ICD-10-CM

## 2021-09-24 PROCEDURE — 97110 THERAPEUTIC EXERCISES: CPT | Performed by: PHYSICAL THERAPIST

## 2021-09-24 PROCEDURE — 97112 NEUROMUSCULAR REEDUCATION: CPT | Performed by: PHYSICAL THERAPIST

## 2021-09-24 NOTE — PROGRESS NOTES
Daily Note     Today's date: 2021  Patient name: Devin Mcpherson  : 1995  MRN: [id number]  Referring provider: Roxy Valenzuela PA-C  Dx:   Encounter Diagnosis     ICD-10-CM    1  Stiffness of left knee  M25 662    2  Acute pain of left knee  M25 562                   Subjective: John Paul Huber has been working on weight shiftin and prolonged walking  Objective: See treatment diary below      Assessment: Tolerated treatment well and improved tolerance for standig marching  Patient demonstrated fatigue post treatment, exhibited good technique with therapeutic exercises and would benefit from continued PT      Plan: Continue per plan of care        Precautions: Down's Syndrome      Manuals 9/21 9/24    8/26 9/8 9/10 9/13 9/15   Prone achilles STM             seated HS and proximal calf                                       Neuro Re-Ed             PPT seated 20x JL    20x 20x PB 15x PB x15 PB x15   Cat/camel             Quadriped LE ext              PB abd press supine 20x JL    5"x20 20x      Supine UE ext with TA bracing      GTB 20x GTB 20x Blue 15x Blue 2x10 Blue 2x10    Supine TB anti rotation nv JL     Blue 10x ea PB seated blue 10x ea PB seated x10 ea PB seated x10 ea    HS elongation       JL      Standing abdominal brace with PB on table 20x JL    20x 20x 20x GH SK   PPT in standing with shoudlers against wall 20x JL           Exaggerated gait at railing (emphasize heel strike)              Biodex weight shift 8' JL  40% JL    5' 5' 55%, 60% JL 8' GH  50% 8' SK                Ther Ex             Bike 5' JL    8' L1 5' L2 5' L2 5' L2 5' L2   Bridge      Green 2x10       SLR             Heel slides             LAQ              TM inc walk             PB rolls forward             SL leg press 40# 2x10 50# 20x    30# 2x10 40# 2x10 40# 2x10 40# 2x10 40# 2x10   [place] TKE             Ther Activity                                       Gait Training                                       Modalities

## 2021-09-28 ENCOUNTER — APPOINTMENT (OUTPATIENT)
Dept: PHYSICAL THERAPY | Facility: CLINIC | Age: 26
End: 2021-09-28
Payer: COMMERCIAL

## 2021-09-30 ENCOUNTER — OFFICE VISIT (OUTPATIENT)
Dept: PHYSICAL THERAPY | Facility: CLINIC | Age: 26
End: 2021-09-30
Payer: COMMERCIAL

## 2021-09-30 DIAGNOSIS — M25.562 ACUTE PAIN OF LEFT KNEE: ICD-10-CM

## 2021-09-30 DIAGNOSIS — M25.662 STIFFNESS OF LEFT KNEE: Primary | ICD-10-CM

## 2021-09-30 PROCEDURE — 97112 NEUROMUSCULAR REEDUCATION: CPT | Performed by: PHYSICAL THERAPIST

## 2021-09-30 PROCEDURE — 97110 THERAPEUTIC EXERCISES: CPT | Performed by: PHYSICAL THERAPIST

## 2021-10-05 ENCOUNTER — APPOINTMENT (OUTPATIENT)
Dept: PHYSICAL THERAPY | Facility: CLINIC | Age: 26
End: 2021-10-05
Payer: COMMERCIAL

## 2021-10-07 ENCOUNTER — OFFICE VISIT (OUTPATIENT)
Dept: PHYSICAL THERAPY | Facility: CLINIC | Age: 26
End: 2021-10-07
Payer: COMMERCIAL

## 2021-10-07 DIAGNOSIS — M25.662 STIFFNESS OF LEFT KNEE: Primary | ICD-10-CM

## 2021-10-07 DIAGNOSIS — M25.562 ACUTE PAIN OF LEFT KNEE: ICD-10-CM

## 2021-10-07 PROCEDURE — 97112 NEUROMUSCULAR REEDUCATION: CPT

## 2021-10-07 PROCEDURE — 97110 THERAPEUTIC EXERCISES: CPT

## 2021-10-14 ENCOUNTER — OFFICE VISIT (OUTPATIENT)
Dept: PHYSICAL THERAPY | Facility: CLINIC | Age: 26
End: 2021-10-14
Payer: COMMERCIAL

## 2021-10-14 DIAGNOSIS — M25.662 STIFFNESS OF LEFT KNEE: Primary | ICD-10-CM

## 2021-10-14 DIAGNOSIS — M25.562 ACUTE PAIN OF LEFT KNEE: ICD-10-CM

## 2021-10-14 PROCEDURE — 97112 NEUROMUSCULAR REEDUCATION: CPT | Performed by: PHYSICAL THERAPIST

## 2021-10-21 ENCOUNTER — OFFICE VISIT (OUTPATIENT)
Dept: PHYSICAL THERAPY | Facility: CLINIC | Age: 26
End: 2021-10-21
Payer: COMMERCIAL

## 2021-10-21 ENCOUNTER — APPOINTMENT (OUTPATIENT)
Dept: PHYSICAL THERAPY | Facility: CLINIC | Age: 26
End: 2021-10-21
Payer: COMMERCIAL

## 2021-10-21 DIAGNOSIS — M25.662 STIFFNESS OF LEFT KNEE: Primary | ICD-10-CM

## 2021-10-21 DIAGNOSIS — M25.562 ACUTE PAIN OF LEFT KNEE: ICD-10-CM

## 2021-10-21 PROCEDURE — 97112 NEUROMUSCULAR REEDUCATION: CPT

## 2021-10-21 PROCEDURE — 97110 THERAPEUTIC EXERCISES: CPT

## 2021-10-22 ENCOUNTER — APPOINTMENT (OUTPATIENT)
Dept: PHYSICAL THERAPY | Facility: CLINIC | Age: 26
End: 2021-10-22
Payer: COMMERCIAL

## 2021-10-28 ENCOUNTER — OFFICE VISIT (OUTPATIENT)
Dept: PHYSICAL THERAPY | Facility: CLINIC | Age: 26
End: 2021-10-28
Payer: COMMERCIAL

## 2021-10-28 DIAGNOSIS — M25.662 STIFFNESS OF LEFT KNEE: Primary | ICD-10-CM

## 2021-10-28 DIAGNOSIS — M25.562 ACUTE PAIN OF LEFT KNEE: ICD-10-CM

## 2021-10-28 PROCEDURE — 97112 NEUROMUSCULAR REEDUCATION: CPT

## 2021-10-28 PROCEDURE — 97110 THERAPEUTIC EXERCISES: CPT

## 2021-11-03 ENCOUNTER — OFFICE VISIT (OUTPATIENT)
Dept: PHYSICAL THERAPY | Facility: CLINIC | Age: 26
End: 2021-11-03
Payer: COMMERCIAL

## 2021-11-03 DIAGNOSIS — M25.662 STIFFNESS OF LEFT KNEE: Primary | ICD-10-CM

## 2021-11-03 DIAGNOSIS — M25.562 ACUTE PAIN OF LEFT KNEE: ICD-10-CM

## 2021-11-03 PROCEDURE — 97110 THERAPEUTIC EXERCISES: CPT | Performed by: PHYSICAL THERAPIST

## 2021-11-03 PROCEDURE — 97112 NEUROMUSCULAR REEDUCATION: CPT | Performed by: PHYSICAL THERAPIST

## 2021-11-11 ENCOUNTER — OFFICE VISIT (OUTPATIENT)
Dept: PHYSICAL THERAPY | Facility: CLINIC | Age: 26
End: 2021-11-11
Payer: COMMERCIAL

## 2021-11-11 DIAGNOSIS — M25.662 STIFFNESS OF LEFT KNEE: Primary | ICD-10-CM

## 2021-11-11 DIAGNOSIS — M25.562 ACUTE PAIN OF LEFT KNEE: ICD-10-CM

## 2021-11-11 PROCEDURE — 97110 THERAPEUTIC EXERCISES: CPT

## 2021-11-11 PROCEDURE — 97112 NEUROMUSCULAR REEDUCATION: CPT

## 2021-11-18 ENCOUNTER — EVALUATION (OUTPATIENT)
Dept: PHYSICAL THERAPY | Facility: CLINIC | Age: 26
End: 2021-11-18
Payer: COMMERCIAL

## 2021-11-18 DIAGNOSIS — M62.838 MUSCLE SPASTICITY: ICD-10-CM

## 2021-11-18 DIAGNOSIS — R26.9 GAIT ABNORMALITY: Primary | ICD-10-CM

## 2021-11-18 PROCEDURE — 97161 PT EVAL LOW COMPLEX 20 MIN: CPT | Performed by: PHYSICAL THERAPIST

## 2021-11-18 PROCEDURE — 97112 NEUROMUSCULAR REEDUCATION: CPT | Performed by: PHYSICAL THERAPIST

## 2021-11-24 ENCOUNTER — APPOINTMENT (OUTPATIENT)
Dept: PHYSICAL THERAPY | Facility: CLINIC | Age: 26
End: 2021-11-24
Payer: COMMERCIAL

## 2022-01-05 ENCOUNTER — EVALUATION (OUTPATIENT)
Dept: PHYSICAL THERAPY | Facility: CLINIC | Age: 27
End: 2022-01-05
Payer: MEDICARE

## 2022-01-05 DIAGNOSIS — R29.898 LEFT LEG WEAKNESS: Primary | ICD-10-CM

## 2022-01-05 PROCEDURE — 97112 NEUROMUSCULAR REEDUCATION: CPT | Performed by: PHYSICAL THERAPIST

## 2022-01-05 PROCEDURE — 97110 THERAPEUTIC EXERCISES: CPT | Performed by: PHYSICAL THERAPIST

## 2022-01-05 PROCEDURE — 97161 PT EVAL LOW COMPLEX 20 MIN: CPT | Performed by: PHYSICAL THERAPIST

## 2022-01-05 NOTE — PROGRESS NOTES
PT Evaluation     Today's date: 2022  Patient name: Carole Doyle  : 1995  MRN: [id number]  Referring provider: Kamilah Arredondo MD  Dx:   Encounter Diagnosis     ICD-10-CM    1  Left leg weakness  R29 898                   Assessment  Assessment details: Carole Doyle is a 31y o  year old female presenting to PT with pain, decreased range of motion, decreased strength, and decreased tolerance to activity  The existing impairments result in difficulty participating in special Olympics activities due to decreased strength and endurance as well as increased left knee discomfort  She has progressed well towards return to sport and no longer requires use of SPC  Emerita Mancilla would benefit from skilled PT services to address these issues and to maximize function  Home exercise provided and all questions answered  Thank you for the referral     Understanding of Dx/Px/POC: fair   Prognosis: good    Goals  STG (2-4 weeks)  Independent with HEP  Pain <5/10  Independent with all core and stability exercises without form deficits  ROM WFL all planes in BUE and BLE    LTG  (discharge)  Return to sport without acute pain generated by activity  BLE strength within 20% contralateral LE  Independent with dynamic warm up and soft tissue self care          Plan  Planned therapy interventions: manual therapy, neuromuscular re-education, postural training, stretching, therapeutic activities, therapeutic exercise and balance  Frequency: 2x week  Duration in weeks: 8        Subjective Evaluation    History of Present Illness  Mechanism of injury: Higinio resumes PT after a period of home maintenance for left knee extension ROM, core stability and weight bearing tolerance onto the LLE  She has been progressing well and now prepared to work on higher level balance tasks  She would like to return to sports including basketball, volleyball       Pain  Aggravating factors: running, lifting and standing  Progression: improved    Patient Goals  Patient goals for therapy: decreased pain, increased motion and return to sport/leisure activities          Objective     General Comments:      Lumbar Comments  Increased lumbar lordosis with decreased dissociation thoracolumbar spine resulting in impaired posture control during dynamic activity   L knee extension lacking 15 deg   Standing tolerance approximately 15-20 minutes  Walking tolerance 1/4 mile  Decreased LLE single limb balance:  20 sec single limb stance RLE,  3 sec single limb stance time LLE  Biodex weight bearing %:  75% accuracy over 2 minute trial for even right to left weight bearing    LE strength  Hip flexion  Right:  4+/5   Left:  4/5  Knee extension  Right:  4+/5  Left 4/5  Knee flexion  Right:  4+/5  Left:  4/5             Precautions:       Manuals 1/5                                                                Neuro Re-Ed             Biodex 5' 75-90% LOS+ KELVIN            PB prone glutes             PC weight shifting 10# 10x F, B            Lumbar flattening agains wall 10x            Supine abd brace and paloff press             MB squat+ throw 4# 10x            Functional march with TB resistance             Ther Ex             Bike 5'            Treadmill 6 ' Inc 5 0,  1 2 mph            Leg press SL 45# 3x10                                                                                Ther Activity                                       Gait Training                                       Modalities

## 2022-01-05 NOTE — LETTER
2022    Tanisha Gallardo MD  [address] [facility] [address]    Patient: Deneen Mcgrath   YOB: 1995   Date of Visit: 2022     Encounter Diagnosis     ICD-10-CM    1  Left leg weakness  R29 898        Dear Dr Liyah Bansal: Thank you for your recent referral of Deneen Mcgrath  Please review the attached evaluation summary from Anne's recent visit  Please verify that you agree with the plan of care by signing the attached order  If you have any questions or concerns, please do not hesitate to call  I sincerely appreciate the opportunity to share in the care of one of your patients and hope to have another opportunity to work with you in the near future  Sincerely,    Genna Bob, PT      Referring Provider:      I certify that I have read the below Plan of Care and certify the need for these services furnished under this plan of treatment while under my care  Tanisha Gallardo MD  [address] [facility] [address]  Via Fax: [phone]          PT Evaluation     Today's date: 2022  Patient name: Deneen Mcgrath  : 1995  MRN: [id number]  Referring provider: Jeanna Buenrostro MD  Dx:   Encounter Diagnosis     ICD-10-CM    1  Left leg weakness  R29 898                   Assessment  Assessment details: Deneen Mcgrath is a 31y o  year old female presenting to PT with pain, decreased range of motion, decreased strength, and decreased tolerance to activity  The existing impairments result in difficulty participating in special Olympics activities due to decreased strength and endurance as well as increased left knee discomfort  She has progressed well towards return to sport and no longer requires use of SPC  Brayan Case would benefit from skilled PT services to address these issues and to maximize function  Home exercise provided and all questions answered   Thank you for the referral     Understanding of Dx/Px/POC: fair   Prognosis: good    Goals  STG (2-4 weeks)  Independent with HEP  Pain <5/10  Independent with all core and stability exercises without form deficits  ROM WFL all planes in BUE and BLE    LTG  (discharge)  Return to sport without acute pain generated by activity  BLE strength within 20% contralateral LE  Independent with dynamic warm up and soft tissue self care          Plan  Planned therapy interventions: manual therapy, neuromuscular re-education, postural training, stretching, therapeutic activities, therapeutic exercise and balance  Frequency: 2x week  Duration in weeks: 8        Subjective Evaluation    History of Present Illness  Mechanism of injury: Higinio resumes PT after a period of home maintenance for left knee extension ROM, core stability and weight bearing tolerance onto the LLE  She has been progressing well and now prepared to work on higher level balance tasks  She would like to return to sports including basketball, volleyball       Pain  Aggravating factors: running, lifting and standing  Progression: improved    Patient Goals  Patient goals for therapy: decreased pain, increased motion and return to sport/leisure activities          Objective     General Comments:      Lumbar Comments  Increased lumbar lordosis with decreased dissociation thoracolumbar spine resulting in impaired posture control during dynamic activity   L knee extension lacking 15 deg   Standing tolerance approximately 15-20 minutes  Walking tolerance 1/4 mile  Decreased LLE single limb balance:  20 sec single limb stance RLE,  3 sec single limb stance time LLE  Biodex weight bearing %:  75% accuracy over 2 minute trial for even right to left weight bearing    LE strength  Hip flexion  Right:  4+/5   Left:  4/5  Knee extension  Right:  4+/5  Left 4/5  Knee flexion  Right:  4+/5  Left:  4/5             Precautions:       Manuals 1/5 Neuro Re-Ed             Biodex 5' 75-90% LOS+ KELVIN            PB prone glutes             PC weight shifting 10# 10x F, B            Lumbar flattening agains wall 10x            Supine abd brace and paloff press             MB squat+ throw 4# 10x            Functional march with TB resistance             Ther Ex             Bike 5'            Treadmill 6 ' Inc 5 0,  1 2 mph            Leg press SL 45# 3x10                                                                                Ther Activity                                       Gait Training                                       Modalities

## 2022-01-12 ENCOUNTER — OFFICE VISIT (OUTPATIENT)
Dept: PHYSICAL THERAPY | Facility: CLINIC | Age: 27
End: 2022-01-12
Payer: MEDICARE

## 2022-01-12 DIAGNOSIS — R29.898 LEFT LEG WEAKNESS: Primary | ICD-10-CM

## 2022-01-12 PROCEDURE — 97110 THERAPEUTIC EXERCISES: CPT | Performed by: PHYSICAL THERAPIST

## 2022-01-12 PROCEDURE — 97112 NEUROMUSCULAR REEDUCATION: CPT | Performed by: PHYSICAL THERAPIST

## 2022-01-12 NOTE — PROGRESS NOTES
Daily Note     Today's date: 2022  Patient name: Deneen Mcgrath  : 1995  MRN: [id number]  Referring provider: Jeanna Buenrostro MD  Dx:   Encounter Diagnosis     ICD-10-CM    1  Left leg weakness  R29 898                   Subjective: Higinio is feeling good, progressing well towards return to sport      Objective: See treatment diary below      Assessment: Tolerated treatment well  Patient exhibited good technique with therapeutic exercises and would benefit from continued PT      Plan: Continue per plan of care  Progress treatment as tolerated         Precautions:       Manuals                                                                Neuro Re-Ed             Biodex 5' 75-90% LOS+ KELVIN JL           PB prone glutes             PC weight shifting 10# 10x F, B 10# 15x5" F,B           Lumbar flattening agains wall 10x            Supine abd brace and paloff press             MB squat+ throw 4# 10x 4# 10x           MB slams             Functional march with TB resistance             Ther Ex             Bike 5' 10'           Treadmill 6 ' Inc 5 0,  1 2 mph nv           Leg press SL 45# 3x10    JL 60#          Bear crawl  10' F B                                                               Ther Activity                                       Gait Training                                       Modalities

## 2022-01-19 ENCOUNTER — OFFICE VISIT (OUTPATIENT)
Dept: PHYSICAL THERAPY | Facility: CLINIC | Age: 27
End: 2022-01-19
Payer: MEDICARE

## 2022-01-19 DIAGNOSIS — R29.898 LEFT LEG WEAKNESS: Primary | ICD-10-CM

## 2022-01-19 PROCEDURE — 97110 THERAPEUTIC EXERCISES: CPT | Performed by: PHYSICAL THERAPIST

## 2022-01-19 PROCEDURE — 97112 NEUROMUSCULAR REEDUCATION: CPT | Performed by: PHYSICAL THERAPIST

## 2022-01-19 NOTE — PROGRESS NOTES
Daily Note     Today's date: 2022  Patient name: Juan Manuel Delgado  : 1995  MRN: [id number]  Referring provider: Maged Lantigua MD  Dx:   Encounter Diagnosis     ICD-10-CM    1  Left leg weakness  R29 898                   Subjective: Isabelle Hall felt some discomfort inside her left leg after last visit  Objective: See treatment diary below      Assessment: Tolerated treatment well and limited squatting depth today  Added more multi directional movement to increase hip activation without compromising lumbar posture    Patient demonstrated fatigue post treatment      Plan: Continue per plan of care  Progress treatment as tolerated         Precautions:       Manuals                                                               Neuro Re-Ed             Biodex 5' 75-90% LOS+ KELVIN JL 10'          [place] lateral and backward walking   10# 3x ea          [place] bar forward step ups   10# 5x ea          [place] bar forward running   10# 10x          PC weight shifting 10# 10x F, B 10# 15x5" F,B           Lumbar flattening agains wall 10x            [place]  brace and paloff press             MB squat+ throw 4# 10x 4# 10x 4# 15x          MB slams             Functional march with TB resistance             Ther Ex             Bike 5' 10'           Treadmill 6 ' Inc 5 0,  1 2 mph nv 6 ' with inc          Leg press SL 45# 3x10    JL 45# 2x10          Bear crawl  10' F B                                                               Ther Activity                                       Gait Training                                       Modalities

## 2022-01-26 ENCOUNTER — OFFICE VISIT (OUTPATIENT)
Dept: PHYSICAL THERAPY | Facility: CLINIC | Age: 27
End: 2022-01-26
Payer: MEDICARE

## 2022-01-26 DIAGNOSIS — R29.898 LEFT LEG WEAKNESS: Primary | ICD-10-CM

## 2022-01-26 PROCEDURE — 97110 THERAPEUTIC EXERCISES: CPT | Performed by: PHYSICAL THERAPIST

## 2022-01-26 PROCEDURE — 97112 NEUROMUSCULAR REEDUCATION: CPT | Performed by: PHYSICAL THERAPIST

## 2022-01-26 NOTE — PROGRESS NOTES
Daily Note     Today's date: 2022  Patient name: Juan Manuel Delgado  : 1995  MRN: [id number]  Referring provider: Maged Lantigua MD  Dx:   Encounter Diagnosis     ICD-10-CM    1  Left leg weakness  R29 898                   Subjective: Feeling good overall  Had some leg "shaking" since last visit, intermittently  Objective: See treatment diary below      Assessment: Tolerated treatment well and continues ot progress well  Patient demonstrated fatigue post treatment and would benefit from continued PT      Plan: Continue per plan of care        Precautions:       Manuals                                                              Neuro Re-Ed             Biodex 5' 75-90% LOS+ KELVIN JL 10' JL         [place] lateral and backward walking   10# 3x ea 3x 10# side, 15# 5x back         [place] bar forward step ups   10# 5x ea 15# 5x ea         [place] bar forward running   10# 10x 15# 10x         PC weight shifting 10# 10x F, B 10# 15x5" F,B           Lumbar flattening agains wall 10x            [place]  brace and paloff press             MB squat+ throw 4# 10x 4# 10x 4# 15x 6# 15x         MB slams             Functional march with TB resistance             Ther Ex             Bike 5' 10'           Treadmill 6 ' Inc 5 0,  1 2 mph nv 6 ' with inc 8'         Leg press SL 45# 3x10    JL 45# 2x10 45# 3x10         Bear crawl  10' F B           Shoulder taps    10x ea                                                Ther Activity                                       Gait Training                                       Modalities

## 2022-02-02 ENCOUNTER — OFFICE VISIT (OUTPATIENT)
Dept: PHYSICAL THERAPY | Facility: CLINIC | Age: 27
End: 2022-02-02
Payer: MEDICARE

## 2022-02-02 DIAGNOSIS — R29.898 LEFT LEG WEAKNESS: Primary | ICD-10-CM

## 2022-02-02 PROCEDURE — 97112 NEUROMUSCULAR REEDUCATION: CPT | Performed by: PHYSICAL THERAPIST

## 2022-02-02 PROCEDURE — 97110 THERAPEUTIC EXERCISES: CPT | Performed by: PHYSICAL THERAPIST

## 2022-02-02 NOTE — PROGRESS NOTES
Daily Note     Today's date: 2022  Patient name: Avelino Blood  : 1995  MRN: [id number]  Referring provider: Thomas Ozuna MD  Dx:   Encounter Diagnosis     ICD-10-CM    1  Left leg weakness  R29 898                   Subjective: More redness in the left lower leg in recent days with some itching and skin dryness  Objective: See treatment diary below      Assessment: Tolerated treatment well and able to complete full routine and benefits from cuing for posture during dynamic strengthenig tasks  Reviewed compression strategy and skin care with emphasis on a pH neutral moisturizer to manage redness and itching of the left lower leg  Remain on the lookout for signs of cellulitis  Patient would benefit from continued PT      Plan: Continue per plan of care        Precautions:       Manuals                                                             Neuro Re-Ed             Biodex 5' 75-90% LOS+ KELVIN JL 10' JL JL        [place] lateral and backward walking   10# 3x ea 3x 10# side, 15# 5x back JL        [place] bar forward step ups   10# 5x ea 15# 5x ea JL        [place] bar forward running   10# 10x 15# 10x JL        PC weight shifting 10# 10x F, B 10# 15x5" F,B           Lumbar flattening agains wall 10x            [place]  brace and paloff press             MB squat+ throw 4# 10x 4# 10x 4# 15x 6# 15x JL        MB slams             Functional march with TB resistance             Ther Ex             Bike 5' 10'           Treadmill 6 ' Inc 5 0,  1 2 mph nv 6 ' with inc 8' 10'        Leg press SL 45# 3x10    JL 45# 2x10 45# 3x10 60# 2x10        Bear crawl  10' F B           Shoulder taps    10x ea 10x ea                                               Ther Activity                                       Gait Training                                       Modalities

## 2022-02-09 ENCOUNTER — OFFICE VISIT (OUTPATIENT)
Dept: PHYSICAL THERAPY | Facility: CLINIC | Age: 27
End: 2022-02-09
Payer: MEDICARE

## 2022-02-09 DIAGNOSIS — R29.898 LEFT LEG WEAKNESS: Primary | ICD-10-CM

## 2022-02-09 PROCEDURE — 97110 THERAPEUTIC EXERCISES: CPT | Performed by: PHYSICAL THERAPIST

## 2022-02-09 PROCEDURE — 97112 NEUROMUSCULAR REEDUCATION: CPT | Performed by: PHYSICAL THERAPIST

## 2022-02-09 NOTE — PROGRESS NOTES
Daily Note     Today's date: 2022  Patient name: Raquel Jackson  : 1995  MRN: [id number]  Referring provider: Roverto Schmidt MD  Dx:   Encounter Diagnosis     ICD-10-CM    1  Left leg weakness  R29 898                   Subjective: no new complaints today  Her skin redness seems worse with dependent positioning  Objective: See treatment diary below      Assessment: Tolerated treatment well and tolerates progression of resistance well today  Patient demonstrated fatigue post treatment, exhibited good technique with therapeutic exercises and would benefit from continued PT      Plan: Continue per plan of care  Progress treatment as tolerated         Precautions:       Manuals                                                            Neuro Re-Ed             Biodex 5' 75-90% LOS+ KELVIN JL 10' JL JL JL       [place] lateral and backward walking   10# 3x ea 3x 10# side, 15# 5x back JL 15# 3x ea       [place] bar forward step ups   10# 5x ea 15# 5x ea JL JL       [place] bar forward running   10# 10x 15# 10x JL JL       PC weight shifting 10# 10x F, B 10# 15x5" F,B           Lumbar flattening agains wall 10x            [place]  brace and paloff press             MB squat+ throw 4# 10x 4# 10x 4# 15x 6# 15x JL JL       MB slams             Functional march with TB resistance             Ther Ex             Bike 5' 10'           Treadmill 6 ' Inc 5 0,  1 2 mph nv 6 ' with inc 8' 10' JL       Leg press SL 45# 3x10    JL 45# 2x10 45# 3x10 60# 2x10 JL       Bear crawl  10' F B           Shoulder taps    10x ea 10x ea JL                                              Ther Activity                                       Gait Training                                       Modalities

## 2022-02-16 ENCOUNTER — OFFICE VISIT (OUTPATIENT)
Dept: PHYSICAL THERAPY | Facility: CLINIC | Age: 27
End: 2022-02-16
Payer: MEDICARE

## 2022-02-16 DIAGNOSIS — R29.898 LEFT LEG WEAKNESS: Primary | ICD-10-CM

## 2022-02-16 PROCEDURE — 97112 NEUROMUSCULAR REEDUCATION: CPT | Performed by: PHYSICAL THERAPIST

## 2022-02-16 PROCEDURE — 97110 THERAPEUTIC EXERCISES: CPT | Performed by: PHYSICAL THERAPIST

## 2022-02-16 NOTE — PROGRESS NOTES
Daily Note     Today's date: 2022  Patient name: Gladis Grier  : 1995  MRN: [id number]  Referring provider: Darnell Kay MD  Dx:   Encounter Diagnosis     ICD-10-CM    1  Left leg weakness  R29 898                   Subjective: Continues to progress well  She was able to participate in basket      Objective: See treatment diary below      Assessment: Tolerated treatment well and challenged with addition of BOSU exericses today  Patient demonstrated fatigue post treatment      Plan: Continue per plan of care  Progress treatment as tolerated         Precautions:       Manuals                                                           Neuro Re-Ed             Biodex 5' 75-90% LOS+ KELVIN JL 10' JL JL JL JL      [place] lateral and backward walking   10# 3x ea 3x 10# side, 15# 5x back JL 15# 3x ea 15# lateral 3x ea      [place] bar forward step ups   10# 5x ea 15# 5x ea JL JL 18# 10x ea      [place] bar forward running   10# 10x 15# 10x JL JL       PC weight shifting 10# 10x F, B 10# 15x5" F,B           Lumbar flattening agains wall 10x            [place]  brace and paloff press             MB squat+ throw 4# 10x 4# 10x 4# 15x 6# 15x JL JL 10# 10x      MB slams       8# 10x      BOSU mini squats       4x5      [place] walk       GTB 3x      Ther Ex             Bike 5' 10'           Treadmill 6 ' Inc 5 0,  1 2 mph nv 6 ' with inc 8' 10' JL 10'      Leg press SL 45# 3x10    JL 45# 2x10 45# 3x10 60# 2x10 JL JL      Bear crawl  10' F B           Shoulder taps    10x ea 10x ea JL                                              Ther Activity                                       Gait Training                                       Modalities

## 2022-02-23 ENCOUNTER — OFFICE VISIT (OUTPATIENT)
Dept: PHYSICAL THERAPY | Facility: CLINIC | Age: 27
End: 2022-02-23
Payer: MEDICARE

## 2022-02-23 DIAGNOSIS — R29.898 LEFT LEG WEAKNESS: Primary | ICD-10-CM

## 2022-02-23 PROCEDURE — 97112 NEUROMUSCULAR REEDUCATION: CPT | Performed by: PHYSICAL THERAPIST

## 2022-02-23 PROCEDURE — 97110 THERAPEUTIC EXERCISES: CPT | Performed by: PHYSICAL THERAPIST

## 2022-02-23 NOTE — PROGRESS NOTES
Daily Note     Today's date: 2022  Patient name: Juan Manuel Delgado  : 1995  MRN: [id number]  Referring provider: Maged Lantigua MD  Dx:   Encounter Diagnosis     ICD-10-CM    1  Left leg weakness  R29 898                   Subjective: Continues to improve, would like to continue PT      Objective: See treatment diary below      Assessment: Tolerated treatment well and improved balance on BOSU today  Patient demonstrated fatigue post treatment and would benefit from continued PT      Plan: Continue per plan of care        Precautions:       Manuals                                                          Neuro Re-Ed             Biodex 5' 75-90% LOS+ KELVIN JL 10' JL JL JL JL JL     [place] lateral and backward walking   10# 3x ea 3x 10# side, 15# 5x back JL 15# 3x ea 15# lateral 3x ea JL     [place] bar forward step ups   10# 5x ea 15# 5x ea JL JL 18# 10x ea 18# 10x ea     [place] bar forward running   10# 10x 15# 10x JL JL       PC weight shifting 10# 10x F, B 10# 15x5" F,B           Lumbar flattening agains wall 10x            [place]  brace and paloff press             MB squat+ throw 4# 10x 4# 10x 4# 15x 6# 15x JL JL 10# 10x 10# x12     MB slams       8# 10x 8# 12x     BOSU mini squats       4x5 4x5     [place] walk       GTB 3x      Ther Ex             Bike 5' 10'           Treadmill 6 ' Inc 5 0,  1 2 mph nv 6 ' with inc 8' 10' JL 10' JL     Leg press SL 45# 3x10    JL 45# 2x10 45# 3x10 60# 2x10 JL JL JL     Bear crawl  10' F B           Shoulder taps    10x ea 10x ea JL                                              Ther Activity                                       Gait Training                                       Modalities

## 2022-03-04 ENCOUNTER — OFFICE VISIT (OUTPATIENT)
Dept: PHYSICAL THERAPY | Facility: CLINIC | Age: 27
End: 2022-03-04
Payer: MEDICARE

## 2022-03-04 DIAGNOSIS — R29.898 LEFT LEG WEAKNESS: Primary | ICD-10-CM

## 2022-03-04 PROCEDURE — 97110 THERAPEUTIC EXERCISES: CPT | Performed by: PHYSICAL THERAPIST

## 2022-03-04 PROCEDURE — 97112 NEUROMUSCULAR REEDUCATION: CPT | Performed by: PHYSICAL THERAPIST

## 2022-03-04 NOTE — PROGRESS NOTES
Daily Note     Today's date: 3/4/2022  Patient name: Raquel Jackson  : 1995  MRN: [id number]  Referring provider: Roverto Schmidt MD  Dx:   Encounter Diagnosis     ICD-10-CM    1  Left leg weakness  R29 898                   Subjective: Reports feeling good today      Objective: See treatment diary below      Assessment: Tolerated treatment well and noted with increased antalgic gait today with less knee extension and increased RLE ER/circumduction  Patient demonstrated fatigue post treatment and would benefit from continued PT      Plan: Continue per plan of care  Progress treatment as tolerated         Precautions:       Manuals 1/5 1/12 1/19 1/26 2/2 2/9 2/16 2/23 3/4                                                        Neuro Re-Ed             Biodex 5' 75-90% LOS+ KELVIN JL 10' JL JL JL JL JL JL    [place] lateral and backward walking   10# 3x ea 3x 10# side, 15# 5x back JL 15# 3x ea 15# lateral 3x ea JL     [place] bar forward step ups   10# 5x ea 15# 5x ea JL JL 18# 10x ea 18# 10x ea 20# 10x    [place] bar forward running   10# 10x 15# 10x JL JL       PC weight shifting 10# 10x F, B 10# 15x5" F,B           Lumbar flattening agains wall 10x            [place]  brace and paloff press             MB squat+ throw 4# 10x 4# 10x 4# 15x 6# 15x JL JL 10# 10x 10# x12 JL    MB slams       8# 10x 8# 12x 10# 10x    BOSU mini squats       4x5 4x5     [place] walk       GTB 3x      Ther Ex             Bike 5' 10'           Treadmill 6 ' Inc 5 0,  1 2 mph nv 6 ' with inc 8' 10' JL 10' JL     Leg press SL 45# 3x10    JL 45# 2x10 45# 3x10 60# 2x10 JL JL JL JL    Bear crawl  10' F B           Shoulder taps    10x ea 10x ea JL       Hip abd with PB at wall standing on airex         On airex 10x ea                              Ther Activity                                       Gait Training                                       Modalities

## 2022-03-11 ENCOUNTER — OFFICE VISIT (OUTPATIENT)
Dept: PHYSICAL THERAPY | Facility: CLINIC | Age: 27
End: 2022-03-11
Payer: MEDICARE

## 2022-03-11 DIAGNOSIS — R29.898 LEFT LEG WEAKNESS: Primary | ICD-10-CM

## 2022-03-11 PROCEDURE — 97112 NEUROMUSCULAR REEDUCATION: CPT | Performed by: PHYSICAL THERAPIST

## 2022-03-11 PROCEDURE — 97110 THERAPEUTIC EXERCISES: CPT | Performed by: PHYSICAL THERAPIST

## 2022-03-11 NOTE — PROGRESS NOTES
Daily Note     Today's date: 3/11/2022  Patient name: Dale Bender  : 1995  MRN: [id number]  Referring provider: Andrea Maldonado MD  Dx:   Encounter Diagnosis     ICD-10-CM    1  Left leg weakness  R29 898                   Subjective: Well walking has improved  Recent standing doppler showed vascular dysfunction on the RLE - undergoing procedure to investigate pelvic congestion later this month      Objective: See treatment diary below      Assessment: Tolerated treatment well and gait pattern is improved today -  continues to progress well with strength and single leg balance tasks    Patient demonstrated fatigue post treatment      Plan: Continue per plan of care        Precautions:       Manuals 1/5 1/12 1/19 1/26 2/2 2/9 2/16 2/23 3/4 3/11                                                       Neuro Re-Ed             Biodex 5' 75-90% LOS+ KELVIN JL 10' JL JL JL JL JL JL JL   [place] lateral and backward walking   10# 3x ea 3x 10# side, 15# 5x back JL 15# 3x ea 15# lateral 3x ea JL     [place] bar forward step ups   10# 5x ea 15# 5x ea JL JL 18# 10x ea 18# 10x ea 20# 10x 20# 10x   [place] bar forward running   10# 10x 15# 10x JL JL       PC weight shifting 10# 10x F, B 10# 15x5" F,B           Lumbar flattening agains wall 10x            [place]  brace and paloff press             MB squat+ throw 4# 10x 4# 10x 4# 15x 6# 15x JL JL 10# 10x 10# x12 JL 10# 12x   MB slams       8# 10x 8# 12x 10# 10x 10# 12x   BOSU mini squats       4x5 4x5 2x10 2x10   [place] walk       GTB 3x      Ther Ex             Bike 5' 10'           Treadmill 6 ' Inc 5 0,  1 2 mph nv 6 ' with inc 8' 10' JL 10' JL JL JL   Leg press SL 45# 3x10    JL 45# 2x10 45# 3x10 60# 2x10 JL JL JL JL JL   Bear crawl  10' F B           Shoulder taps    10x ea 10x ea JL       Hip abd with PB at wall standing on airex         On airex 10x ea 12x airex                             Ther Activity                                       Gait Training Modalities

## 2022-03-18 ENCOUNTER — OFFICE VISIT (OUTPATIENT)
Dept: PHYSICAL THERAPY | Facility: CLINIC | Age: 27
End: 2022-03-18
Payer: MEDICARE

## 2022-03-18 DIAGNOSIS — R29.898 LEFT LEG WEAKNESS: Primary | ICD-10-CM

## 2022-03-18 PROCEDURE — 97112 NEUROMUSCULAR REEDUCATION: CPT | Performed by: PHYSICAL THERAPIST

## 2022-03-18 PROCEDURE — 97110 THERAPEUTIC EXERCISES: CPT | Performed by: PHYSICAL THERAPIST

## 2022-03-18 NOTE — PROGRESS NOTES
Daily Note     Today's date: 3/18/2022  Patient name: Avelino Blood  : 1995  MRN: [id number]  Referring provider: Thomas Ozuna MD  Dx:   Encounter Diagnosis     ICD-10-CM    1  Left leg weakness  R29 898                   Subjective: Vascular procedure rescheduled for April  Objective: See treatment diary below      Assessment: Tolerated treatment well and noted improvements in balance on BOSU tasks  Patient demonstrated fatigue post treatment and would benefit from continued PT      Plan: Continue per plan of care  Progress treatment as tolerated  Precautions:       Manuals 3/18    2/2 2/9 2/16 2/23 3/4 3/11                                                       Neuro Re-Ed             Biodex LOS    JL JL JL JL JL JL   [place]iss lateral and backward walking     JL 15# 3x ea 15# lateral 3x ea JL     [place] bar forward step ups 20# 10x ea    JL JL 18# 10x ea 18# 10x ea 20# 10x 20# 10x   Liss bar forward running     JL JL       PC weight shifting             Lumbar flattening agains wall             [place]  brace and paloff press             MB squat+ throw 10# 12x    JL JL 10# 10x 10# x12 JL 10# 12x   MB slams 10# 12x      8# 10x 8# 12x 10# 10x 10# 12x   BOSU mini squats 2x10      4x5 4x5 2x10 2x10   Monster walk       GTB 3x      Ther Ex             Bike             Treadmill 10'   8 mph    10' JL 10' JL JL JL   Leg press JL    60# 2x10 JL JL JL JL JL   Bear crawl             Shoulder taps     10x ea JL       Hip abd with PB at wall standing on airex nv        On airex 10x ea 12x airex                             Ther Activity                                       Gait Training                                       Modalities

## 2022-03-25 ENCOUNTER — OFFICE VISIT (OUTPATIENT)
Dept: PHYSICAL THERAPY | Facility: CLINIC | Age: 27
End: 2022-03-25
Payer: MEDICARE

## 2022-03-25 DIAGNOSIS — R29.898 LEFT LEG WEAKNESS: Primary | ICD-10-CM

## 2022-03-25 PROCEDURE — 97112 NEUROMUSCULAR REEDUCATION: CPT | Performed by: PHYSICAL THERAPIST

## 2022-03-25 PROCEDURE — 97110 THERAPEUTIC EXERCISES: CPT | Performed by: PHYSICAL THERAPIST

## 2022-03-25 NOTE — PROGRESS NOTES
Daily Note     Today's date: 3/25/2022  Patient name: Diane Singh  : 1995  MRN: [id number]  Referring provider: Azra Quinn MD  Dx:   Encounter Diagnosis     ICD-10-CM    1  Left leg weakness  R29 898                   Subjective: Elsie Garrison continues to notice improvement, able to participate more in basketball practice this week      Objective: See treatment diary below      Assessment: Tolerated treatment well  Patient demonstrated fatigue post treatment, exhibited good technique with therapeutic exercises and would benefit from continued PT      Plan: Continue per plan of care  Precautions:       Manuals 3/18 3/25   2/2 2/9 2/16 2/23 3/4 3/11                                                       Neuro Re-Ed             Biodex LOS LOS, weight bearing 10'   JL JL JL JL JL JL   [place] lateral and backward walking     JL 15# 3x ea 15# lateral 3x ea JL     [place] bar forward step ups 20# 10x ea 22# 10x ea   JL JL 18# 10x ea 18# 10x ea 20# 10x 20# 10x   [place] bar forward running     JL JL       PC weight shifting             Lumbar flattening agains wall             [place]  brace and paloff press             MB squat+ throw 10# 12x 10# 12x   JL JL 10# 10x 10# x12 JL 10# 12x   MB slams 10# 12x 10# 12x     8# 10x 8# 12x 10# 10x 10# 12x   BOSU mini squats 2x10 2x10     4x5 4x5 2x10 2x10   Star bal on BOSU  10x           Ther Ex             Bike             Treadmill 10'   8 mph JL   10' JL 10' JL JL JL   Leg press JL    60# 2x10 JL JL JL JL JL   Bear crawl             Shoulder taps     10x ea JL       Hip abd with PB at wall standing on airex nv        On airex 10x ea 12x airex                             Ther Activity                                       Gait Training                                       Modalities

## 2022-09-01 ENCOUNTER — EVALUATION (OUTPATIENT)
Dept: PHYSICAL THERAPY | Facility: CLINIC | Age: 27
End: 2022-09-01
Payer: MEDICARE

## 2022-09-01 DIAGNOSIS — M76.61 RIGHT ACHILLES TENDINITIS: Primary | ICD-10-CM

## 2022-09-01 DIAGNOSIS — M72.2 PLANTAR FASCIITIS, RIGHT: ICD-10-CM

## 2022-09-01 PROCEDURE — 97161 PT EVAL LOW COMPLEX 20 MIN: CPT | Performed by: PHYSICAL THERAPIST

## 2022-09-01 PROCEDURE — 97112 NEUROMUSCULAR REEDUCATION: CPT | Performed by: PHYSICAL THERAPIST

## 2022-09-01 NOTE — LETTER
2022    Anila [address] [facility] [address]    Patient: Arnaldo Mukhrejee   YOB: 1995   Date of Visit: 2022     Encounter Diagnosis     ICD-10-CM    1  Right Achilles tendinitis  M76 61    2  Plantar fasciitis, right  M72 2        Dear Dr Margaret Agustin: Thank you for your recent referral of Arnaldo Mukherjee  Please review the attached evaluation summary from Anne's recent visit  Please verify that you agree with the plan of care by signing the attached order  If you have any questions or concerns, please do not hesitate to call  I sincerely appreciate the opportunity to share in the care of one of your patients and hope to have another opportunity to work with you in the near future  Sincerely,    Tonia August, PT      Referring Provider:      I certify that I have read the below Plan of Care and certify the need for these services furnished under this plan of treatment while under my care  Nabor Fitzpatrick DPM  [phone]  Via Fax: [phone]          PT Evaluation     Today's date: 2022  Patient name: Arnaldo Mukherjee  : 1995  MRN: [id number]  Referring provider: Jeannette Woo DPM  Dx:   Encounter Diagnosis     ICD-10-CM    1  Right Achilles tendinitis  M76 61    2  Plantar fasciitis, right  M72 2                   Assessment  Assessment details: Arnaldo Mukherjee is a 31y o  year old female presenting to PT with pain, decreased range of motion, decreased strength, and decreased tolerance to activity  Signs and symptoms are consistent with referring diagnosis of right achilles tendonitis and plantar fasciitis  The existing impairments result in difficulty with prolonged standing/walking tasks  Jaguar Shabazz would benefit from skilled PT services to address these issues and to maximize function  Home exercise provided and all questions answered   Thank you for the referral     Impairments: activity intolerance, impaired physical strength, lacks appropriate home exercise program and pain with function    Goals  STG  Decrease pain to <5/10 with ambulation  Demonstrate R foot/ankle ROM WNL all planes  Ambulate >30 minutes without increase in pain    LTG  Independent with HEP  Decrease pain to <2/10  Ambulate >120 minutes without exacerbation of pain  >80% compliance with heel lift and orthotic wear      Plan  Planned therapy interventions: manual therapy, neuromuscular re-education, therapeutic activities, therapeutic exercise and patient education  Frequency: 2x week  Duration in weeks: 12        Subjective Evaluation    History of Present Illness  Mechanism of injury: Increasing R foot and heel pain since May of this year  She has been wearing immobilizer boot for several months Pain still occurs with increase in walking time and prevents her from participating in her usual sport       Diagnostic Tests  MRI studies: abnormal  Treatments  Previous treatment: immobilization  Current treatment: physical therapy  Patient Goals  Patient goals for therapy: decreased pain and return to sport/leisure activities          Objective     Passive Range of Motion     Right Ankle/Foot    Dorsiflexion (ke): 12 degrees     Strength/Myotome Testing     Right Ankle/Foot   Dorsiflexion: 4  Inversion: 4-  Eversion: 4-  Great toe extension: 4-             Precautions: na      Manuals 9/1            R midfoot, achilles, plantar fascia 10'                                                   Neuro Re-Ed             Toe splay, toe yoga, arch doming 10x ea                                                                                          Ther Ex             Bke L3 8'            Leg press DL 2x10 80#                                                                                          Ther Activity                                       Gait Training                                       Modalities

## 2022-09-01 NOTE — PROGRESS NOTES
PT Evaluation     Today's date: 2022  Patient name: Juan Manuel Delgado  : 1995  MRN: [id number]  Referring provider: Trevor Borja DPM  Dx:   Encounter Diagnosis     ICD-10-CM    1  Right Achilles tendinitis  M76 61    2  Plantar fasciitis, right  M72 2                   Assessment  Assessment details: Juan Manuel Delgado is a 31y o  year old female presenting to PT with pain, decreased range of motion, decreased strength, and decreased tolerance to activity  Signs and symptoms are consistent with referring diagnosis of right achilles tendonitis and plantar fasciitis  The existing impairments result in difficulty with prolonged standing/walking tasks  Ahmed Early would benefit from skilled PT services to address these issues and to maximize function  Home exercise provided and all questions answered  Thank you for the referral     Impairments: activity intolerance, impaired physical strength, lacks appropriate home exercise program and pain with function    Goals  STG  Decrease pain to <5/10 with ambulation  Demonstrate R foot/ankle ROM WNL all planes  Ambulate >30 minutes without increase in pain    LTG  Independent with HEP  Decrease pain to <2/10  Ambulate >120 minutes without exacerbation of pain  >80% compliance with heel lift and orthotic wear      Plan  Planned therapy interventions: manual therapy, neuromuscular re-education, therapeutic activities, therapeutic exercise and patient education  Frequency: 2x week  Duration in weeks: 12        Subjective Evaluation    History of Present Illness  Mechanism of injury: Increasing R foot and heel pain since May of this year  She has been wearing immobilizer boot for several months Pain still occurs with increase in walking time and prevents her from participating in her usual sport       Diagnostic Tests  MRI studies: abnormal  Treatments  Previous treatment: immobilization  Current treatment: physical therapy  Patient Goals  Patient goals for therapy: decreased pain and return to sport/leisure activities          Objective     Passive Range of Motion     Right Ankle/Foot    Dorsiflexion (ke): 12 degrees     Strength/Myotome Testing     Right Ankle/Foot   Dorsiflexion: 4  Inversion: 4-  Eversion: 4-  Great toe extension: 4-             Precautions: na      Manuals 9/1            R midfoot, achilles, plantar fascia 10'                                                   Neuro Re-Ed             Toe splay, toe yoga, arch doming 10x ea                                                                                          Ther Ex             Bke L3 8'            Leg press DL 2x10 80#                                                                                          Ther Activity                                       Gait Training                                       Modalities

## 2022-09-07 ENCOUNTER — OFFICE VISIT (OUTPATIENT)
Dept: PHYSICAL THERAPY | Facility: CLINIC | Age: 27
End: 2022-09-07
Payer: MEDICARE

## 2022-09-07 DIAGNOSIS — M76.61 RIGHT ACHILLES TENDINITIS: Primary | ICD-10-CM

## 2022-09-07 DIAGNOSIS — M72.2 PLANTAR FASCIITIS, RIGHT: ICD-10-CM

## 2022-09-07 PROCEDURE — 97112 NEUROMUSCULAR REEDUCATION: CPT | Performed by: PHYSICAL THERAPIST

## 2022-09-07 PROCEDURE — 97110 THERAPEUTIC EXERCISES: CPT | Performed by: PHYSICAL THERAPIST

## 2022-09-07 PROCEDURE — 97140 MANUAL THERAPY 1/> REGIONS: CPT | Performed by: PHYSICAL THERAPIST

## 2022-09-07 NOTE — PROGRESS NOTES
Daily Note     Today's date: 2022  Patient name: Arnaldo Mukherjee  : 1995  MRN: [id number]  Referring provider: Jeannette Woo DPM  Dx:   Encounter Diagnosis     ICD-10-CM    1  Right Achilles tendinitis  M76 61    2  Plantar fasciitis, right  M72 2                   Subjective: Still notices right heel pain, but more localized compared to last visit  Objective: See treatment diary below      Assessment: Tolerated treatment well  Patient demonstrated fatigue post treatment, exhibited good technique with therapeutic exercises and would benefit from continued PT      Plan: Continue per plan of care        Precautions: na      Manuals            R midfoot, achilles, plantar fascia 10' JL                                                  Neuro Re-Ed             Toe splay, toe yoga, arch doming 10x ea            Ankle TB x4  GTB 12x ea           12 roll gastroc stretch  10"x5 ea                                                               Ther Ex             Bke L3 8' JL           Leg press DL 2x10 80# 80# 3x10           Bridge  2x10                                                                            Ther Activity                                       Gait Training                                       Modalities

## 2022-09-09 ENCOUNTER — OFFICE VISIT (OUTPATIENT)
Dept: PHYSICAL THERAPY | Facility: CLINIC | Age: 27
End: 2022-09-09
Payer: MEDICARE

## 2022-09-09 DIAGNOSIS — M72.2 PLANTAR FASCIITIS, RIGHT: ICD-10-CM

## 2022-09-09 DIAGNOSIS — M76.61 RIGHT ACHILLES TENDINITIS: Primary | ICD-10-CM

## 2022-09-09 PROCEDURE — 97140 MANUAL THERAPY 1/> REGIONS: CPT | Performed by: PHYSICAL THERAPIST

## 2022-09-09 PROCEDURE — 97110 THERAPEUTIC EXERCISES: CPT | Performed by: PHYSICAL THERAPIST

## 2022-09-09 PROCEDURE — 97112 NEUROMUSCULAR REEDUCATION: CPT | Performed by: PHYSICAL THERAPIST

## 2022-09-09 NOTE — PROGRESS NOTES
Daily Note     Today's date: 2022  Patient name: Carole Doyle  : 1995  MRN: [id number]  Referring provider: Patti Carrington DPM  Dx:   Encounter Diagnosis     ICD-10-CM    1  Right Achilles tendinitis  M76 61    2  Plantar fasciitis, right  M72 2                   Subjective: No significant change in foot pain  Still able to participate in volleyball and softball  Objective: See treatment diary below      Assessment: Tolerated treatment well and cuing required to coordinate eccentric HR exercise today  Patient demonstrated fatigue post treatment      Plan: Continue per plan of care        Precautions: na      Manuals           R midfoot, achilles, plantar fascia 10' JL JL                                                 Neuro Re-Ed             Toe splay, toe yoga, arch doming 10x ea            Ankle TB x4  GTB 12x ea nv          12 roll gastroc stretch  10"x5 ea JL          HR eccentrics   20x ea                                                 Ther Ex             Bke L3 8' JL           Leg press DL 2x10 80# 80# 3x10 95# 3x10          Bridge  2x10 nv          BOSU squats   20x                                                              Ther Activity                                       Gait Training                                       Modalities

## 2022-09-13 ENCOUNTER — OFFICE VISIT (OUTPATIENT)
Dept: PHYSICAL THERAPY | Facility: CLINIC | Age: 27
End: 2022-09-13
Payer: MEDICARE

## 2022-09-13 DIAGNOSIS — M76.61 RIGHT ACHILLES TENDINITIS: Primary | ICD-10-CM

## 2022-09-13 DIAGNOSIS — M72.2 PLANTAR FASCIITIS, RIGHT: ICD-10-CM

## 2022-09-13 PROCEDURE — 97112 NEUROMUSCULAR REEDUCATION: CPT | Performed by: PHYSICAL THERAPIST

## 2022-09-13 PROCEDURE — 97140 MANUAL THERAPY 1/> REGIONS: CPT | Performed by: PHYSICAL THERAPIST

## 2022-09-13 PROCEDURE — 97110 THERAPEUTIC EXERCISES: CPT | Performed by: PHYSICAL THERAPIST

## 2022-09-13 NOTE — PROGRESS NOTES
Daily Note     Today's date: 2022  Patient name: Megan Saba  : 1995  MRN: [id number]  Referring provider: Wilmar Edgar DPM  Dx:   Encounter Diagnosis     ICD-10-CM    1  Right Achilles tendinitis  M76 61    2  Plantar fasciitis, right  M72 2                   Subjective: Feeling a little bit better, fewer complaints of foot pain      Objective: See treatment diary below      Assessment: Tolerated treatment well and continued to progress exercise  Patient demonstrated fatigue post treatment      Plan: Continue per plan of care        Precautions: na      Manuals          R midfoot, achilles, plantar fascia 10' JL JL JL                                                Neuro Re-Ed             Toe splay, toe yoga, arch doming 10x ea            Ankle TB x4  GTB 12x ea nv          12 roll gastroc stretch  10"x5 ea JL JL         HR eccentrics   20x ea 20x ea         Biodex    10'                                   Ther Ex             Bike L3 8' JL  JL         Leg press DL 2x10 80# 80# 3x10 95# 3x10 JL         Bridge  2x10 nv          BOSU squats   20x JL                                                             Ther Activity                                       Gait Training                                       Modalities

## 2022-09-15 ENCOUNTER — OFFICE VISIT (OUTPATIENT)
Dept: PHYSICAL THERAPY | Facility: CLINIC | Age: 27
End: 2022-09-15
Payer: MEDICARE

## 2022-09-15 DIAGNOSIS — M72.2 PLANTAR FASCIITIS, RIGHT: ICD-10-CM

## 2022-09-15 DIAGNOSIS — M76.61 RIGHT ACHILLES TENDINITIS: Primary | ICD-10-CM

## 2022-09-15 PROCEDURE — 97112 NEUROMUSCULAR REEDUCATION: CPT | Performed by: PHYSICAL THERAPIST

## 2022-09-15 PROCEDURE — 97140 MANUAL THERAPY 1/> REGIONS: CPT | Performed by: PHYSICAL THERAPIST

## 2022-09-15 PROCEDURE — 97110 THERAPEUTIC EXERCISES: CPT | Performed by: PHYSICAL THERAPIST

## 2022-09-15 NOTE — PROGRESS NOTES
Daily Note     Today's date: 9/15/2022  Patient name: Alexia Ro  : 1995  MRN: [id number]  Referring provider: Foreign Snyder DPM  Dx:   Encounter Diagnosis     ICD-10-CM    1  Right Achilles tendinitis  M76 61    2  Plantar fasciitis, right  M72 2                   Subjective: Mom noticed some limping by the end of volleyball practice yesterday  Objective: See treatment diary below      Assessment: Tolerated treatment well and continues to progress well with all balance and strengthening    Patient exhibited good technique with therapeutic exercises and would benefit from continued PT      Plan: Continue per plan of care        Precautions: na      Manuals 9/1 9/7 9/9 9/13 9/15        R midfoot, achilles, plantar fascia 10' JL JL JL JL                                               Neuro Re-Ed             Toe splay, toe yoga, arch doming 10x ea            Ankle TB x4  GTB 12x ea nv          12 roll gastroc stretch  10"x5 ea JL JL         HR eccentrics   20x ea 20x ea nv        Biodex    10' 10'                                  Ther Ex             Bike L3 8' JL  JL JL        Leg press DL 2x10 80# 80# 3x10 95# 3x10 JL JL        Bridge  2x10 nv          BOSU squats   20x JL JL        TB side stepping     GTB 4x                                               Ther Activity                                       Gait Training                                       Modalities

## 2022-09-19 ENCOUNTER — OFFICE VISIT (OUTPATIENT)
Dept: PHYSICAL THERAPY | Facility: CLINIC | Age: 27
End: 2022-09-19
Payer: MEDICARE

## 2022-09-19 DIAGNOSIS — M72.2 PLANTAR FASCIITIS, RIGHT: ICD-10-CM

## 2022-09-19 DIAGNOSIS — M76.61 RIGHT ACHILLES TENDINITIS: Primary | ICD-10-CM

## 2022-09-19 PROCEDURE — 97112 NEUROMUSCULAR REEDUCATION: CPT | Performed by: PHYSICAL THERAPIST

## 2022-09-19 PROCEDURE — 97140 MANUAL THERAPY 1/> REGIONS: CPT | Performed by: PHYSICAL THERAPIST

## 2022-09-19 PROCEDURE — 97110 THERAPEUTIC EXERCISES: CPT | Performed by: PHYSICAL THERAPIST

## 2022-09-19 NOTE — PROGRESS NOTES
Daily Note     Today's date: 2022  Patient name: Maria A Rabago  : 1995  MRN: [id number]  Referring provider: Edna Patel DPM  Dx:   Encounter Diagnosis     ICD-10-CM    1  Right Achilles tendinitis  M76 61    2  Plantar fasciitis, right  M72 2                   Subjective: Limping late in the day or after participating in sports  Notice leg/foot fatigue by end of PT today  Objective: See treatment diary below      Assessment: Tolerated treatment well  Patient demonstrated fatigue post treatment, exhibited good technique with therapeutic exercises and would benefit from continued PT      Plan: Continue per plan of care  Progress treatment as tolerated         Precautions: na      Manuals 9/1 9/7 9/9 9/13 9/15 9/19       R midfoot, achilles, plantar fascia 10' JL JL JL JL JL                                              Neuro Re-Ed             Toe splay, toe yoga, arch doming 10x ea            Ankle TB x4  GTB 12x ea nv          12 roll gastroc stretch  10"x5 ea JL JL         HR eccentrics   20x ea 20x ea nv        Biodex    10' 10' 10'       Arch doming on airex      20x                    Ther Ex             Bike L3 8' JL  JL JL        Leg press DL 2x10 80# 80# 3x10 95# 3x10 JL JL JL       Bridge  2x10 nv          BOSU squats   20x JL JL 20x       TB side stepping     GTB 4x GTB 4x                                              Ther Activity                                       Gait Training                                       Modalities 82 yoF with sig PMHx of CAD, lateral wall MI s/p 6 stents, dm, htn, ckd, RLE DVT, PE. early dementia s/p RLE angiogram (5/17) for b/l great toe ulcers and rest pain now s/p  RLE CFA to Peroneal bypass with rGSV with completion angio on 5/22    Neuro: prn percocet/tylenol, gabapentin 300 tid, sertraline 50 qd  CV: normotensive goals. 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 early dementia s/p RLE angiogram (5/17) for b/l great toe ulcers and rest pain now s/p  RLE CFA to Peroneal bypass with rGSV with completion angio on 5/22    Neuro: prn percocet/tylenol, gabapentin 300 tid, sertraline 50 qd  CV: normotensive goals. 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

## 2022-09-22 ENCOUNTER — OFFICE VISIT (OUTPATIENT)
Dept: PHYSICAL THERAPY | Facility: CLINIC | Age: 27
End: 2022-09-22
Payer: MEDICARE

## 2022-09-22 DIAGNOSIS — M76.61 RIGHT ACHILLES TENDINITIS: Primary | ICD-10-CM

## 2022-09-22 DIAGNOSIS — M72.2 PLANTAR FASCIITIS, RIGHT: ICD-10-CM

## 2022-09-22 PROCEDURE — 97110 THERAPEUTIC EXERCISES: CPT | Performed by: PHYSICAL THERAPIST

## 2022-09-22 PROCEDURE — 97112 NEUROMUSCULAR REEDUCATION: CPT | Performed by: PHYSICAL THERAPIST

## 2022-09-22 PROCEDURE — 97140 MANUAL THERAPY 1/> REGIONS: CPT | Performed by: PHYSICAL THERAPIST

## 2022-09-23 NOTE — PROGRESS NOTES
Daily Note     Today's date: 2022  Patient name: Maricruz Ferrari  : 1995  MRN: [id number]  Referring provider: Pravin Elam DPM  Dx:   Encounter Diagnosis     ICD-10-CM    1  Right Achilles tendinitis  M76 61    2  Plantar fasciitis, right  M72 2                   Subjective: Flare up of foot pain on Tuesday this week  She was experiencing pain in the AM that prevented her from weight bearing on the right foot  Objective: See treatment diary below      Assessment: Tolerated treatment well and performed all exercise well  Added additional heel lift under shoe insert on the right  Encouraged Higinio to wear shoes at home to gain the benefit of support through Achilles and plantar fascia at home    Patient demonstrated fatigue post treatment, exhibited good technique with therapeutic exercises and would benefit from continued PT      Plan: Continue per plan of care        Precautions: na      Manuals 9/1 9/7 9/9 9/13 9/15 9/19 9/23      R midfoot, achilles, plantar fascia 10' JL JL JL JL JL JL                                             Neuro Re-Ed             Toe splay, toe yoga, arch doming 10x ea            Ankle TB x4  GTB 12x ea nv          12 roll gastroc stretch  10"x5 ea JL JL         HR eccentrics   20x ea 20x ea nv        Biodex    10' 10' 10' JL      Arch doming on airex      20x                    Ther Ex             Bike L3 8' JL  JL JL  JL      Leg press DL 2x10 80# 80# 3x10 95# 3x10 JL JL JL JL      Bridge  2x10 nv          BOSU squats   20x JL JL 20x 30x      TB side stepping     GTB 4x GTB 4x nv                                             Ther Activity                                       Gait Training                                       Modalities

## 2022-09-26 ENCOUNTER — OFFICE VISIT (OUTPATIENT)
Dept: PHYSICAL THERAPY | Facility: CLINIC | Age: 27
End: 2022-09-26
Payer: MEDICARE

## 2022-09-26 DIAGNOSIS — M72.2 PLANTAR FASCIITIS, RIGHT: ICD-10-CM

## 2022-09-26 DIAGNOSIS — M76.61 RIGHT ACHILLES TENDINITIS: Primary | ICD-10-CM

## 2022-09-26 PROCEDURE — 97140 MANUAL THERAPY 1/> REGIONS: CPT | Performed by: PHYSICAL THERAPIST

## 2022-09-26 PROCEDURE — 97110 THERAPEUTIC EXERCISES: CPT | Performed by: PHYSICAL THERAPIST

## 2022-09-26 PROCEDURE — 97112 NEUROMUSCULAR REEDUCATION: CPT | Performed by: PHYSICAL THERAPIST

## 2022-09-26 NOTE — PROGRESS NOTES
Daily Note     Today's date: 2022  Patient name: Lisa Jackson  : 1995  MRN: [id number]  Referring provider: Silas Saini DPM  Dx:   Encounter Diagnosis     ICD-10-CM    1  Right Achilles tendinitis  M76 61    2  Plantar fasciitis, right  M72 2                   Subjective: Feels a little better with heel lift in the right shoe  Has been wearing shoes inside the house more and stretching in the AM      Objective: See treatment diary below      Assessment: Tolerated treatment well  Patient demonstrated fatigue post treatment and would benefit from continued PT      Plan: Continue per plan of care        Precautions: na      Manuals 9/1 9/7 9/9 9/13 9/15 9/19 9/23 9/26     R midfoot, achilles, plantar fascia 10' JL JL JL JL JL JL JL                                            Neuro Re-Ed             Toe splay, toe yoga, arch doming 10x ea            Ankle TB x4  GTB 12x ea nv          12 roll gastroc stretch  10"x5 ea JL JL         HR eccentrics   20x ea 20x ea nv        Biodex    10' 10' 10' JL 10'     Arch doming on airex      20x  20x                  Ther Ex             Bike L3 8' JL  JL JL  JL JL     Leg press DL 2x10 80# 80# 3x10 95# 3x10 JL JL JL JL JL     Bridge  2x10 nv          BOSU squats   20x JL JL 20x 30x 30     TB side stepping     GTB 4x GTB 4x nv GTB 4x                                            Ther Activity                                       Gait Training                                       Modalities

## 2022-09-28 ENCOUNTER — OFFICE VISIT (OUTPATIENT)
Dept: PHYSICAL THERAPY | Facility: CLINIC | Age: 27
End: 2022-09-28
Payer: MEDICARE

## 2022-09-28 DIAGNOSIS — M76.61 RIGHT ACHILLES TENDINITIS: Primary | ICD-10-CM

## 2022-09-28 PROCEDURE — 97112 NEUROMUSCULAR REEDUCATION: CPT | Performed by: PHYSICAL THERAPIST

## 2022-09-28 PROCEDURE — 97140 MANUAL THERAPY 1/> REGIONS: CPT | Performed by: PHYSICAL THERAPIST

## 2022-09-28 PROCEDURE — 97110 THERAPEUTIC EXERCISES: CPT | Performed by: PHYSICAL THERAPIST

## 2022-09-29 NOTE — PROGRESS NOTES
Daily Note     Today's date: 2022  Patient name: Paula Rudolph  : 1995  MRN: [id number]  Referring provider: Mehrdad Mirza DPM  Dx:   Encounter Diagnosis     ICD-10-CM    1  Right Achilles tendinitis  M76 61                   Subjective: Noticed some discomfort during bowling earlier in the week  Objective: See treatment diary below      Assessment: Tolerated treatment well and benefits from TCs while seated for arch doming  Less tenderness through the heel during MT, but more noted in plantar fascia  Patient demonstrated fatigue post treatment, exhibited good technique with therapeutic exercises and would benefit from continued PT      Plan: Continue per plan of care  Progress treatment as tolerated         Precautions: na      Manuals 9/1 9/7 9/9 9/13 9/15 9/19 9/23 9/26 9/29    R midfoot, achilles, plantar fascia 10' JL JL JL JL JL JL JL JL                                           Neuro Re-Ed             Toe splay, toe yoga, arch doming 10x ea            Ankle TB x4  GTB 12x ea nv          12 roll gastroc stretch  10"x5 ea JL JL         HR eccentrics   20x ea 20x ea nv        Biodex    10' 10' 10' JL 10' JL    Arch doming on airex      20x  20x seated with TCs                 Ther Ex             Bike L3 8' JL  JL JL  JL JL JL    Leg press DL 2x10 80# 80# 3x10 95# 3x10 JL JL JL JL JL JL    Bridge  2x10 nv          BOSU squats   20x JL JL 20x 30x 30 20x    TB side stepping     GTB 4x GTB 4x nv GTB 4x nv                                           Ther Activity                                       Gait Training                                       Modalities

## 2022-10-03 ENCOUNTER — OFFICE VISIT (OUTPATIENT)
Dept: PHYSICAL THERAPY | Facility: CLINIC | Age: 27
End: 2022-10-03
Payer: MEDICARE

## 2022-10-03 DIAGNOSIS — M76.61 RIGHT ACHILLES TENDINITIS: Primary | ICD-10-CM

## 2022-10-03 DIAGNOSIS — M72.2 PLANTAR FASCIITIS, RIGHT: ICD-10-CM

## 2022-10-03 PROCEDURE — 97110 THERAPEUTIC EXERCISES: CPT | Performed by: PHYSICAL THERAPIST

## 2022-10-03 PROCEDURE — 97140 MANUAL THERAPY 1/> REGIONS: CPT | Performed by: PHYSICAL THERAPIST

## 2022-10-03 PROCEDURE — 97112 NEUROMUSCULAR REEDUCATION: CPT | Performed by: PHYSICAL THERAPIST

## 2022-10-03 NOTE — PROGRESS NOTES
Daily Note     Today's date: 10/3/2022  Patient name: Raquel Jackson  : 1995  MRN: [id number]  Referring provider: Arnel Melendez DPM  Dx:   Encounter Diagnosis     ICD-10-CM    1  Right Achilles tendinitis  M76 61    2  Plantar fasciitis, right  M72 2                   Subjective: Increased lateral foot pain last week  Felt better after wearing boot       Objective: See treatment diary below      Assessment: Tolerated treatment fair and noted with increased crepitus compared to last week    Patient would benefit from continued PT      Plan: Continue per plan of care        Precautions: na      Manuals 9/1 9/7 9/9 9/13 9/15 9/19 9/23 9/26 9/29 10/3   R midfoot, achilles, plantar fascia 10' JL JL JL JL JL JL JL JL JL                                          Neuro Re-Ed             Toe splay, toe yoga, arch doming 10x ea            Ankle TB x4  GTB 12x ea nv          12 roll gastroc stretch  10"x5 ea JL JL         HR eccentrics   20x ea 20x ea nv        Biodex    10' 10' 10' JL 10' JL JL   Arch doming on airex      20x  20x seated with TCs JL                Ther Ex             Bike L3 8' JL  JL JL  JL JL JL JL   Leg press DL 2x10 80# 80# 3x10 95# 3x10 JL JL JL JL JL JL JL   Bridge  2x10 nv          BOSU squats   20x JL JL 20x 30x 30 20x 30x   TB side stepping     GTB 4x GTB 4x nv GTB 4x nv                                           Ther Activity                                       Gait Training                                       Modalities

## 2022-10-05 ENCOUNTER — OFFICE VISIT (OUTPATIENT)
Dept: PHYSICAL THERAPY | Facility: CLINIC | Age: 27
End: 2022-10-05
Payer: MEDICARE

## 2022-10-05 DIAGNOSIS — M76.61 RIGHT ACHILLES TENDINITIS: Primary | ICD-10-CM

## 2022-10-05 DIAGNOSIS — M72.2 PLANTAR FASCIITIS, RIGHT: ICD-10-CM

## 2022-10-05 PROCEDURE — 97140 MANUAL THERAPY 1/> REGIONS: CPT | Performed by: PHYSICAL THERAPIST

## 2022-10-05 PROCEDURE — 97112 NEUROMUSCULAR REEDUCATION: CPT | Performed by: PHYSICAL THERAPIST

## 2022-10-05 PROCEDURE — 97110 THERAPEUTIC EXERCISES: CPT | Performed by: PHYSICAL THERAPIST

## 2022-10-05 NOTE — PROGRESS NOTES
Daily Note     Today's date: 10/5/2022  Patient name: Sabi Dean  : 1995  MRN: [id number]  Referring provider: Abisai Rodriguez DPM  Dx:   Encounter Diagnosis     ICD-10-CM    1  Right Achilles tendinitis  M76 61    2  Plantar fasciitis, right  M72 2                   Subjective: Feeling ok this week  Podiatry recommended trial of Graston for her Achilles and plantar fascia      Objective: See treatment diary below      Assessment: Tolerated treatment well and noted with benefit from Graston, improved tolerance for stretching by end of session    Patient demonstrated fatigue post treatment and would benefit from continued PT      Plan: Continue per plan of care  Progress treatment as tolerated         Precautions: na      Manuals 10/5      9/23 9/26 9/29 10/3   R midfoot, achilles, plantar fascia 5'      JL JL JL JL   Iastm 10'                                      Neuro Re-Ed             Toe splay, toe yoga, arch doming             Ankle TB x4             1/2 roll gastroc stretch 10:x10            HR eccentrics             Biodex       JL 10' JL JL   Arch doming on airex        20x seated with TCs JL                Ther Ex             Bike L3 8'      JL JL JL JL   Leg press DL 3x10 95#      JL JL JL JL   Bridge             BOSU squats 2x10      30x 30 20x 30x   TB side stepping GTB 3x      nv GTB 4x nv                                           Ther Activity                                       Gait Training                                       Modalities

## 2022-10-11 ENCOUNTER — OFFICE VISIT (OUTPATIENT)
Dept: PHYSICAL THERAPY | Facility: CLINIC | Age: 27
End: 2022-10-11
Payer: MEDICARE

## 2022-10-11 DIAGNOSIS — M76.61 RIGHT ACHILLES TENDINITIS: Primary | ICD-10-CM

## 2022-10-11 DIAGNOSIS — M72.2 PLANTAR FASCIITIS, RIGHT: ICD-10-CM

## 2022-10-11 PROCEDURE — 97112 NEUROMUSCULAR REEDUCATION: CPT

## 2022-10-11 PROCEDURE — 97110 THERAPEUTIC EXERCISES: CPT

## 2022-10-11 PROCEDURE — 97140 MANUAL THERAPY 1/> REGIONS: CPT

## 2022-10-11 NOTE — PROGRESS NOTES
Daily Note     Today's date: 10/11/2022  Patient name: Kendall Ace  : 1995  MRN: [id number]  Referring provider: Romario Kaur DPM  Dx:   Encounter Diagnosis     ICD-10-CM    1  Right Achilles tendinitis  M76 61    2  Plantar fasciitis, right  M72 2                   Subjective:  Pt reports feeling good today with no new c/o's  Objective: See treatment diary below      Assessment: Tolerated treatment well  Pt cont's to respond well to manual therapy and exercise with improved ankle mobility and strength noted  Patient demonstrated fatigue post treatment and would benefit from continued PT      Plan: Continue per plan of care  Progress treatment as tolerated         Precautions: na      Manuals 10/5 10/11     9/23 9/26 9/29 10/3   R midfoot, achilles, plantar fascia 5' [facility]     JL JL JL JL   Iastm 10' [facility]                                     Neuro Re-Ed             Toe splay, toe yoga, arch doming             Ankle TB x4             1/2 roll gastroc stretch 10:x10 GH           HR eccentrics             Biodex  5'     JL 10' JL JL   Arch doming on airex        20x seated with TCs JL                Ther Ex             Bike L3 8' 10' L1     JL JL JL JL   Leg press DL 3x10 95# GH     JL JL JL JL   Bridge             BOSU squats 2x10 GH     30x 30 20x 30x   TB side stepping GTB 3x GTB x4     nv GTB 4x nv                                           Ther Activity                                       Gait Training                                       Modalities

## 2022-10-13 ENCOUNTER — OFFICE VISIT (OUTPATIENT)
Dept: PHYSICAL THERAPY | Facility: CLINIC | Age: 27
End: 2022-10-13
Payer: MEDICARE

## 2022-10-13 DIAGNOSIS — M76.61 RIGHT ACHILLES TENDINITIS: Primary | ICD-10-CM

## 2022-10-13 DIAGNOSIS — M72.2 PLANTAR FASCIITIS, RIGHT: ICD-10-CM

## 2022-10-13 PROCEDURE — 97140 MANUAL THERAPY 1/> REGIONS: CPT

## 2022-10-13 PROCEDURE — 97110 THERAPEUTIC EXERCISES: CPT

## 2022-10-13 PROCEDURE — 97112 NEUROMUSCULAR REEDUCATION: CPT

## 2022-10-13 NOTE — PROGRESS NOTES
Daily Note     Today's date: 10/13/2022  Patient name: Veto Mattson  : 1995  MRN: [id number]  Referring provider: Erin Lake DPM  Dx:   Encounter Diagnosis     ICD-10-CM    1  Right Achilles tendinitis  M76 61    2  Plantar fasciitis, right  M72 2                   Subjective:  Pt reports feeling good today with no new c/o's  Objective: See treatment diary below      Assessment: Tolerated treatment well  Pt cont's to respond well to manual therapy and exercise with improved ankle mobility and strength noted  Patient demonstrated fatigue post treatment and would benefit from continued PT      Plan: Continue per plan of care  Progress treatment as tolerated         Precautions: na      Manuals 10/5 10/11 10/13    9/23 9/26 9/29 10/3   R midfoot, achilles, plantar fascia 5' [address] [facility] [address]     JL JL JL JL   Iastm 10' [address] [facility] [address] [facility]                                    Neuro Re-Ed             Toe splay, toe yoga, arch doming             Ankle TB x4             1/2 roll gastroc stretch 10:x10 GH GH          HR eccentrics             Biodex  5' [address]    JL 10' JL JL   Arch doming on airex        20x seated with TCs JL                Ther Ex             Bike L3 8' 10' L1 GH    JL JL JL JL   Leg press DL 3x10 95# GH GH    JL JL JL JL   Bridge             BOSU squats 2x10 GH 2x10 1x5    30x 30 20x 30x   TB side stepping GTB 3x GTB x4 GH    nv GTB 4x nv                                           Ther Activity                                       Gait Training                                       Modalities

## 2022-10-17 ENCOUNTER — OFFICE VISIT (OUTPATIENT)
Dept: PHYSICAL THERAPY | Facility: CLINIC | Age: 27
End: 2022-10-17
Payer: MEDICARE

## 2022-10-17 DIAGNOSIS — M72.2 PLANTAR FASCIITIS, RIGHT: ICD-10-CM

## 2022-10-17 DIAGNOSIS — M76.61 RIGHT ACHILLES TENDINITIS: Primary | ICD-10-CM

## 2022-10-17 PROCEDURE — 97110 THERAPEUTIC EXERCISES: CPT | Performed by: PHYSICAL THERAPIST

## 2022-10-17 PROCEDURE — 97140 MANUAL THERAPY 1/> REGIONS: CPT | Performed by: PHYSICAL THERAPIST

## 2022-10-17 PROCEDURE — 97112 NEUROMUSCULAR REEDUCATION: CPT | Performed by: PHYSICAL THERAPIST

## 2022-10-17 NOTE — PROGRESS NOTES
Daily Note     Today's date: 10/17/2022  Patient name: Malcolm Irvin  : 1995  MRN: [id number]  Referring provider: Caterina Palomino DPM  Dx:   Encounter Diagnosis     ICD-10-CM    1  Right Achilles tendinitis  M76 61    2  Plantar fasciitis, right  M72 2                   Subjective: Overall feeling better  Family notices that she has been walking better      Objective: See treatment diary below      Assessment: Tolerated treatment well and progressed strengthening with Liss today with good tolerance  Patient exhibited good technique with therapeutic exercises and would benefit from continued PT      Plan: Continue per plan of care        Precautions: na      Manuals 10/5 10/11 10/13 10/17   9/23 9/26 9/29 10/3   R midfoot, achilles, plantar fascia 5' [facility] GH    JL JL JL JL   Iastm 10' [facility] JL                                   Neuro Re-Ed             Toe splay, toe yoga, arch doming             Ankle TB x4             1/2 roll gastroc stretch 10:x10 GH GH          HR eccentrics             Biodex  5' GH JL   JL 10' JL JL   Arch doming on airex        20x seated with TCs JL   [place] side walk with handle    10# 5x ea         Ther Ex             Bike L3 8' 10' L1 GH JL   JL JL JL JL   Leg press DL 3x10 95# GH GH JL   JL JL JL JL   Bridge    SL 10x ea         BOSU squats 2x10 GH 2x10 1x5 2x10   30x 30 20x 30x   TB side stepping GTB 3x GTB x4 GH    nv GTB 4x nv    [place] Step + press with bar    16# 10x                                   Ther Activity                                       Gait Training                                       Modalities

## 2022-10-19 ENCOUNTER — OFFICE VISIT (OUTPATIENT)
Dept: PHYSICAL THERAPY | Facility: CLINIC | Age: 27
End: 2022-10-19
Payer: MEDICARE

## 2022-10-19 DIAGNOSIS — M72.2 PLANTAR FASCIITIS, RIGHT: ICD-10-CM

## 2022-10-19 DIAGNOSIS — M76.61 RIGHT ACHILLES TENDINITIS: Primary | ICD-10-CM

## 2022-10-19 PROCEDURE — 97112 NEUROMUSCULAR REEDUCATION: CPT | Performed by: PHYSICAL THERAPIST

## 2022-10-19 PROCEDURE — 97140 MANUAL THERAPY 1/> REGIONS: CPT | Performed by: PHYSICAL THERAPIST

## 2022-10-19 PROCEDURE — 97110 THERAPEUTIC EXERCISES: CPT | Performed by: PHYSICAL THERAPIST

## 2022-10-19 NOTE — PROGRESS NOTES
Daily Note     Today's date: 10/19/2022  Patient name: Carole Doyle  : 1995  MRN: [id number]  Referring provider: Patti Carrington DPM  Dx:   Encounter Diagnosis     ICD-10-CM    1  Right Achilles tendinitis  M76 61    2  Plantar fasciitis, right  M72 2                   Subjective: Denies pain today  Objective: See treatment diary below      Assessment: Tolerated treatment well and progressed strengthening with Liss today with good tolerance  Patient exhibited good technique with therapeutic exercises and would benefit from continued PT      Plan: Continue per plan of care        Precautions: na      Manuals 10/5 10/11 10/13 10/17 10/19        R midfoot, achilles, plantar fascia 5' [facility] GH          Iastm 10' [facility] JL ND                                  Neuro Re-Ed             Toe splay, toe yoga, arch doming             Ankle TB x4             1/2 roll gastroc stretch 10:x10 [facility] GH  ND        HR eccentrics             Biodex  5' GH JL         Arch doming on airex             [place] side walk with handle    10# 5x ea         Ther Ex             Bike L3 8' 10' L1 GH JL ND        Leg press DL 3x10 95# GH GH JL ND        Bridge    SL 10x ea         BOSU squats 2x10 GH 2x10 1x5 2x10 2x10        TB side stepping GTB 3x GTB x4 GH          [place] Step + press with bar    16# 10x ND                                  Ther Activity                                       Gait Training                                       Modalities

## 2022-10-24 ENCOUNTER — OFFICE VISIT (OUTPATIENT)
Dept: PHYSICAL THERAPY | Facility: CLINIC | Age: 27
End: 2022-10-24
Payer: MEDICARE

## 2022-10-24 DIAGNOSIS — M72.2 PLANTAR FASCIITIS, RIGHT: Primary | ICD-10-CM

## 2022-10-24 DIAGNOSIS — M76.61 RIGHT ACHILLES TENDINITIS: ICD-10-CM

## 2022-10-24 PROCEDURE — 97140 MANUAL THERAPY 1/> REGIONS: CPT | Performed by: PHYSICAL THERAPIST

## 2022-10-24 PROCEDURE — 97112 NEUROMUSCULAR REEDUCATION: CPT | Performed by: PHYSICAL THERAPIST

## 2022-10-24 PROCEDURE — 97110 THERAPEUTIC EXERCISES: CPT | Performed by: PHYSICAL THERAPIST

## 2022-10-24 NOTE — PROGRESS NOTES
Daily Note     Today's date: 10/24/2022  Patient name: Pb Leonardo  : 1995  MRN: [id number]  Referring provider: Ahmet Clemens DPM  Dx:   Encounter Diagnosis     ICD-10-CM    1  Plantar fasciitis, right  M72 2    2  Right Achilles tendinitis  M76 61                   Subjective: No new complaints reported  Objective: See treatment diary below      Assessment: Tolerated treatment well and progressed strengthening with visible fatigue by end of session  Patient exhibited good technique with therapeutic exercises and would benefit from continued PT      Plan: Continue per plan of care        Precautions: na      Manuals 10/5 10/11 10/13 10/17 10/19 12/24       R midfoot, achilles, plantar fascia 5' [address] GH          Iastm 10' [address] [facility] [address] [facility] JL ND JL                                 Neuro Re-Ed             Toe splay, toe yoga, arch doming             Ankle TB x4             1/2 roll gastroc stretch 10:x10 [address] GH  ND JL       HR eccentrics             Biodex  5' GH JL  JL       Arch doming on airex             [place] side walk with handle    10# 5x ea  JL       Ther Ex             Bike L3 8' 10' L1 GH JL ND JL       Leg press DL 3x10 95# GH GH JL ND JL       Bridge    SL 10x ea         BOSU squats 2x10 GH 2x10 1x5 2x10 2x10 2x10       TB side stepping GTB 3x GTB x4 GH          [place] Step + press with bar    16# 10x ND        BOSU single leg taps F, L, B      5x ea                    Ther Activity                                       Gait Training                                       Modalities

## 2022-10-26 ENCOUNTER — OFFICE VISIT (OUTPATIENT)
Dept: PHYSICAL THERAPY | Facility: CLINIC | Age: 27
End: 2022-10-26
Payer: MEDICARE

## 2022-10-26 DIAGNOSIS — M76.61 RIGHT ACHILLES TENDINITIS: ICD-10-CM

## 2022-10-26 DIAGNOSIS — M72.2 PLANTAR FASCIITIS, RIGHT: Primary | ICD-10-CM

## 2022-10-26 PROCEDURE — 97140 MANUAL THERAPY 1/> REGIONS: CPT | Performed by: PHYSICAL THERAPIST

## 2022-10-26 PROCEDURE — 97110 THERAPEUTIC EXERCISES: CPT | Performed by: PHYSICAL THERAPIST

## 2022-10-26 PROCEDURE — 97112 NEUROMUSCULAR REEDUCATION: CPT | Performed by: PHYSICAL THERAPIST

## 2022-10-26 NOTE — PROGRESS NOTES
Daily Note     Today's date: 10/26/2022  Patient name: Bijal Stephens  : 1995  MRN: [id number]  Referring provider: Jose Walton DPM  Dx:   Encounter Diagnosis     ICD-10-CM    1  Plantar fasciitis, right  M72 2    2  Right Achilles tendinitis  M76 61                   Subjective: No new complaints today      Objective: See treatment diary below      Assessment: Tolerated treatment well  Patient demonstrated fatigue post treatment, exhibited good technique with therapeutic exercises and would benefit from continued PT      Plan: Continue per plan of care        Precautions: na      Manuals 10/5 10/11 10/13 10/17 10/19 12/26       R midfoot, achilles, plantar fascia 5' [address] GH          Iastm 10' [address] [facility] [address] JL ND JL                                 Neuro Re-Ed             Toe splay, toe yoga, arch doming             Ankle TB x4             1/2 roll gastroc stretch 10:x10 [address] GH  ND JL       HR eccentrics             Biodex  5' GH JL  JL       Arch doming on airex             [place] side walk with handle    10# 5x ea  JL       Ther Ex             Bike L3 8' 10' L1 GH JL ND JL       Leg press DL 3x10 95# GH GH JL ND JL       Bridge    SL 10x ea         BOSU squats 2x10 GH 2x10 1x5 2x10 2x10 2x10       TB side stepping GTB 3x GTB x4 GH          [place] Step + press with bar    16# 10x ND        BOSU single leg taps F, L, B      5x ea                    Ther Activity                                       Gait Training                                       Modalities

## 2022-11-03 ENCOUNTER — OFFICE VISIT (OUTPATIENT)
Dept: PHYSICAL THERAPY | Facility: CLINIC | Age: 27
End: 2022-11-03

## 2022-11-03 DIAGNOSIS — M72.2 PLANTAR FASCIITIS, RIGHT: Primary | ICD-10-CM

## 2022-11-03 DIAGNOSIS — M76.61 RIGHT ACHILLES TENDINITIS: ICD-10-CM

## 2022-11-03 NOTE — PROGRESS NOTES
Daily Note     Today's date: 11/3/2022  Patient name: Julia Chi  : 1995  MRN: [id number]  Referring provider: Adebayo Harris DPM  Dx:   Encounter Diagnosis     ICD-10-CM    1  Plantar fasciitis, right  M72 2    2  Right Achilles tendinitis  M76 61                   Subjective:  Pt reports feeling good with no new c/o's  Objective: See treatment diary below      Assessment: Tolerated treatment well  Patient demonstrated fatigue post treatment, exhibited good technique with therapeutic exercises and would benefit from continued PT      Plan: Continue per plan of care        Precautions: na      Manuals 10/5 10/11 10/13 10/17 10/19 12/26 11/3      R midfoot, achilles, plantar fascia 5' [address] GH          Iastm 10' [address] [place] JL ND JL GH                                Neuro Re-Ed             Toe splay, toe yoga, arch doming             Ankle TB x4             1/2 roll gastroc stretch 10:x10 [address] GH  ND JL GH      HR eccentrics             Biodex  5' [address] JL  JL [address] [facility] [address]      Arch doming on airex             [place] side walk with handle    10# 5x ea  JL GH      Ther Ex             Bike L3 8' 10' L1 GH JL ND JL GH      Leg press DL 3x10 95# [address] GH JL ND JL GH      Bridge    SL 10x ea         BOSU squats 2x10 GH 2x10 1x5 2x10 2x10 2x10 GH      TB side stepping GTB 3x GTB x4 GH          [place] Step + press with bar    16# 10x ND        BOSU single leg taps F, L, B      5x ea [address]                   Ther Activity                                       Gait Training                                       Modalities

## 2022-11-07 ENCOUNTER — OFFICE VISIT (OUTPATIENT)
Dept: PHYSICAL THERAPY | Facility: CLINIC | Age: 27
End: 2022-11-07

## 2022-11-07 DIAGNOSIS — M76.61 RIGHT ACHILLES TENDINITIS: ICD-10-CM

## 2022-11-07 DIAGNOSIS — M72.2 PLANTAR FASCIITIS, RIGHT: Primary | ICD-10-CM

## 2022-11-07 NOTE — PROGRESS NOTES
Daily Note     Today's date: 2022  Patient name: Myra Yancey  : 1995  MRN: [id number]  Referring provider: Terese Huston DPM  Dx:   Encounter Diagnosis     ICD-10-CM    1  Plantar fasciitis, right  M72 2    2  Right Achilles tendinitis  M76 61                   Subjective:  Patient reports doing hours of walking over the weekend without increased pain  Objective: See treatment diary below      Assessment: Tolerated treatment well  Patient demonstrated fatigue post treatment, exhibited good technique with therapeutic exercises and would benefit from continued PT  Held manuals today  Plan: Continue per plan of care        Precautions: na      Manuals 10/5 10/11 10/13 10/17 10/19 12/26 11/3 11/7       R midfoot, achilles, plantar fascia 5' [address] GH          Iastm 10' [address] [facility] [address] [facility] JL ND JL GH                                Neuro Re-Ed             Toe splay, toe yoga, arch doming             Ankle TB x4             1/2 roll gastroc stretch 10:x10 [address] GH  ND JL GH ND     HR eccentrics             Biodex  5' [address] [facility] JL  JL GH ND     Arch doming on airex             [place] side walk with handle    10# 5x ea  JL GH 2x20 ft     Ther Ex             Bike L3 8' 10' L1 GH JL ND JL [address] [facility]     Leg press DL 3x10 95# [address] [facility] GH JL ND JL GH ND     Bridge    SL 10x ea         BOSU squats 2x10 GH 2x10 1x5 2x10 2x10 2x10 GH ND     TB side stepping GTB 3x GTB x4 GH          [place] Step + press with bar    16# 10x ND        BOSU single leg taps F, L, B      5x ea [address] [facility] [address]                   Ther Activity                                       Gait Training                                       Modalities

## 2022-11-16 ENCOUNTER — OFFICE VISIT (OUTPATIENT)
Dept: PHYSICAL THERAPY | Facility: CLINIC | Age: 27
End: 2022-11-16

## 2022-11-16 DIAGNOSIS — M72.2 PLANTAR FASCIITIS, RIGHT: Primary | ICD-10-CM

## 2022-11-16 DIAGNOSIS — M76.61 RIGHT ACHILLES TENDINITIS: ICD-10-CM

## 2022-11-16 NOTE — PROGRESS NOTES
Daily Note     Today's date: 2022  Patient name: Sabi Dean  : 1995  MRN: [id number]  Referring provider: Abisai Rodriguez DPM  Dx:   Encounter Diagnosis     ICD-10-CM    1  Plantar fasciitis, right  M72 2       2  Right Achilles tendinitis  M76 61                      Subjective: Feeling better overall  Able to walk longer distances while away on vacation      Objective: See treatment diary below      Assessment: Tolerated treatment well  Patient demonstrated fatigue post treatment, exhibited good technique with therapeutic exercises and would benefit from continued PT      Plan: Continue per plan of care        Precautions: na      Manuals 10/5 10/11 10/13 10/17 10/19 12/26 11/3 11/7   11/16    R midfoot, achilles, plantar fascia 5' [facility] GH          Iastm 10' [facility] JL ND JL [facility]  JL                              Neuro Re-Ed             Toe splay, toe yoga, arch doming             Ankle TB x4             1/2 roll gastroc stretch 10:x10 [facility] GH  ND JL GH ND JL    HR eccentrics             Biodex  5' [facility] JL  JL [facility] ND     Arch doming on airex             [place] side walk with handle    10# 5x ea  JL GH 2x20 ft 10# 10x ea    Ther Ex             Bike L3 8' 10' L1 GH JL ND JL [facility] ND JL    Leg press DL 3x10 95# [facility] GH JL ND JL GH ND JL    Bridge    SL 10x ea         BOSU squats 2x10 GH 2x10 1x5 2x10 2x10 2x10 GH ND JL    TB side stepping GTB 3x GTB x4 GH          [place] Step + press with bar    16# 10x ND        BOSU single leg taps F, L, B      5x ea GH  JL                 Ther Activity                                       Gait Training                                       Modalities

## 2022-11-18 ENCOUNTER — OFFICE VISIT (OUTPATIENT)
Dept: PHYSICAL THERAPY | Facility: CLINIC | Age: 27
End: 2022-11-18

## 2022-11-18 DIAGNOSIS — M76.61 RIGHT ACHILLES TENDINITIS: ICD-10-CM

## 2022-11-18 DIAGNOSIS — M72.2 PLANTAR FASCIITIS, RIGHT: Primary | ICD-10-CM

## 2022-11-18 NOTE — PROGRESS NOTES
Daily Note     Today's date: 2022  Patient name: Dionne Goldman  : 1995  MRN: [id number]  Referring provider: Silverio Rockwell DPM  Dx:   Encounter Diagnosis     ICD-10-CM    1  Plantar fasciitis, right  M72 2       2  Right Achilles tendinitis  M76 61                      Subjective: Feeling better with IASTM       Objective: See treatment diary below      Assessment: Tolerated treatment well  Patient exhibited good technique with therapeutic exercises and would benefit from continued PT      Plan: Continue per plan of care        Precautions: na      Manuals 10/5 10/11 10/13 10/17 10/19 12/26 11/3 11/7   11/18    R midfoot, achilles, plantar fascia 5' [address] GH          Iastm 10' [address] [place] JL ND JL [address] [facility]  JL                              Neuro Re-Ed             Toe splay, toe yoga, arch doming             Ankle TB x4             1/2 roll gastroc stretch 10:x10 [address] GH  ND JL GH ND JL    HR eccentrics             Biodex  5' [address] [place] JL  JL [address] ND JL    Arch doming on airex             [place] side walk with handle    10# 5x ea  JL GH 2x20 ft 10# 10x ea    Ther Ex             Bike L3 8' 10' L1 GH JL ND JL [address] [place] ND JL    Leg press DL 3x10 95# [address] GH JL ND JL GH ND JL    Bridge    SL 10x ea         BOSU squats 2x10 GH 2x10 1x5 2x10 2x10 2x10 GH ND JL    TB side stepping GTB 3x GTB x4 GH          [place] Step + press with bar    16# 10x ND    16# 10x    BOSU single leg taps F, L, B      5x ea GH  JL                 Ther Activity                                       Gait Training                                       Modalities

## 2022-11-21 ENCOUNTER — OFFICE VISIT (OUTPATIENT)
Dept: PHYSICAL THERAPY | Facility: CLINIC | Age: 27
End: 2022-11-21

## 2022-11-21 DIAGNOSIS — M72.2 PLANTAR FASCIITIS, RIGHT: Primary | ICD-10-CM

## 2022-11-21 DIAGNOSIS — M76.61 RIGHT ACHILLES TENDINITIS: ICD-10-CM

## 2022-11-21 NOTE — PROGRESS NOTES
Daily Note     Today's date: 2022  Patient name: Ellouise Brittle  : 1995  MRN: [id number]  Referring provider: Romaine Camarena DPM  Dx:   Encounter Diagnosis     ICD-10-CM    1  Plantar fasciitis, right  M72 2       2  Right Achilles tendinitis  M76 61                      Subjective: Continues to see improvement  Will discuss status with family this week and determine if she will continue with PT or hold for a time  Objective: See treatment diary below      Assessment: Tolerated treatment well  Patient demonstrated fatigue post treatment and would benefit from continued PT      Plan: Continue per plan of care  Progress treatment as tolerated         Precautions: na      Manuals 10/5 10/11 10/13 10/17 10/19 12/26 11/3 11/7   11/21    R midfoot, achilles, plantar fascia 5' [facility] GH          Iastm 10' [facility] JL ND JL [facility]  JL                              Neuro Re-Ed             Toe splay, toe yoga, arch doming             Ankle TB x4             1/2 roll gastroc stretch 10:x10 [facility] GH  ND JL GH ND JL    HR eccentrics             Biodex  5' [facility] JL  JL [facility] ND JL    Arch doming on airex             [place] side walk with handle    10# 5x ea  JL GH 2x20 ft 10# 10x ea    Ther Ex             Bike L3 8' 10' L1 GH JL ND JL [facility] ND JL    Leg press DL 3x10 95# [facility] GH JL ND JL GH ND JL    Bridge    SL 10x ea         BOSU squats 2x10 GH 2x10 1x5 2x10 2x10 2x10 GH ND JL    TB side stepping GTB 3x GTB x4 GH          [place] Step + press with bar    16# 10x ND    16# 10x    BOSU single leg taps F, L, B      5x ea GH  JL                 Ther Activity                                       Gait Training                                       Modalities

## 2022-11-23 ENCOUNTER — OFFICE VISIT (OUTPATIENT)
Dept: PHYSICAL THERAPY | Facility: CLINIC | Age: 27
End: 2022-11-23

## 2022-11-23 DIAGNOSIS — M76.61 RIGHT ACHILLES TENDINITIS: ICD-10-CM

## 2022-11-23 DIAGNOSIS — M72.2 PLANTAR FASCIITIS, RIGHT: Primary | ICD-10-CM

## 2022-11-23 NOTE — PROGRESS NOTES
Daily Note     Today's date: 2022  Patient name: Harry Rose  : 1995  MRN: [id number]  Referring provider: Cecilia Hernandez DPM  Dx:   Encounter Diagnosis     ICD-10-CM    1  Plantar fasciitis, right  M72 2       2  Right Achilles tendinitis  M76 61                      Subjective: Less pain and walking has been better between PT visits  Objective: See treatment diary below      Assessment: Tolerated treatment well  Patient demonstrated fatigue post treatment, exhibited good technique with therapeutic exercises and would benefit from continued PT      Plan: Continue per plan of care    Decrease PT to 1x/wk     Precautions: na      Manuals 10/5 10/11 10/13 10/17 10/19 12/26 11/3 11/7   11/23    R midfoot, achilles, plantar fascia 5' [facility] GH          Iastm 10' [facility] JL ND JL [facility]  JL                              Neuro Re-Ed             Toe splay, toe yoga, arch doming             Ankle TB x4             1/2 roll gastroc stretch 10:x10 [facility] GH  ND JL GH ND JL    HR eccentrics             Biodex  5' [facility] JL  JL [facility] ND JL    Arch doming on airex             [place] side walk with handle    10# 5x ea  JL GH 2x20 ft 10# 10x ea    Ther Ex             Bike L3 8' 10' L1 GH JL ND JL [facility] ND JL    Leg press DL 3x10 95# [facility] GH JL ND JL GH ND JL    Bridge    SL 10x ea         BOSU squats 2x10 GH 2x10 1x5 2x10 2x10 2x10 GH ND JL    TB side stepping GTB 3x GTB x4 GH          [place] Step + press with bar    16# 10x ND    16# 10x    BOSU single leg taps F, L, B      5x ea GH  JL                 Ther Activity                                       Gait Training                                       Modalities

## 2022-12-01 ENCOUNTER — OFFICE VISIT (OUTPATIENT)
Dept: PHYSICAL THERAPY | Facility: CLINIC | Age: 27
End: 2022-12-01

## 2022-12-01 DIAGNOSIS — M76.61 RIGHT ACHILLES TENDINITIS: Primary | ICD-10-CM

## 2022-12-01 DIAGNOSIS — M72.2 PLANTAR FASCIITIS, RIGHT: ICD-10-CM

## 2022-12-01 NOTE — PROGRESS NOTES
Daily Note     Today's date: 2022  Patient name: Harry Rose  : 1995  MRN: [id number]  Referring provider: Cecilia Hernandez DPM  Dx:   Encounter Diagnosis     ICD-10-CM    1  Right Achilles tendinitis  M76 61       2  Plantar fasciitis, right  M72 2                      Subjective: Still noticing that she's walking better with less discomfort  Objective: See treatment diary below      Assessment: Tolerated treatment well  Patient demonstrated fatigue post treatment and would benefit from continued PT      Plan: Continue per plan of care  Progress treatment as tolerated         Precautions: na      Manuals 10/5 10/11 10/13 10/17 10/19 12/26 11/3 11/7   11/23 12/1   R midfoot, achilles, plantar fascia 5' [address] GH          Iastm 10' [address] [place] JL ND JL [address] [facility] [place]  JL JL                             Neuro Re-Ed             Toe splay, toe yoga, arch doming             Ankle TB x4             1/2 roll gastroc stretch 10:x10 [address] GH  ND JL GH ND JL JL   HR eccentrics             Biodex  5' [address] [place] JL  JL [address] [place] ND JL    Arch doming on airex             [place] side walk with handle    10# 5x ea  JL GH 2x20 ft 10# 10x ea JL   Ther Ex             Bike L3 8' 10' L1 GH JL ND JL GH ND JL JL   Leg press DL 3x10 95# GH GH JL ND JL GH ND JL JL   Bridge    SL 10x ea         BOSU squats 2x10 GH 2x10 1x5 2x10 2x10 2x10 GH ND JL JL   TB side stepping GTB 3x GTB x4 GH          [place] Step + press with bar    16# 10x ND    16# 10x    BOSU single leg taps F, L, B      5x ea GH  JL JL                Ther Activity                                       Gait Training                                       Modalities

## 2022-12-07 ENCOUNTER — OFFICE VISIT (OUTPATIENT)
Dept: PHYSICAL THERAPY | Facility: CLINIC | Age: 27
End: 2022-12-07

## 2022-12-07 DIAGNOSIS — M72.2 PLANTAR FASCIITIS, RIGHT: ICD-10-CM

## 2022-12-07 DIAGNOSIS — M76.61 RIGHT ACHILLES TENDINITIS: Primary | ICD-10-CM

## 2022-12-07 NOTE — PROGRESS NOTES
Daily Note     Today's date: 2022  Patient name: Jc Wright  : 1995  MRN: [id number]  Referring provider: Shirley Velez DPM  Dx:   Encounter Diagnosis     ICD-10-CM    1  Right Achilles tendinitis  M76 61       2  Plantar fasciitis, right  M72 2                      Subjective: Less toe walking noted several days following PT      Objective: See treatment diary below      Assessment: Tolerated treatment well  Patient demonstrated fatigue post treatment, exhibited good technique with therapeutic exercises and would benefit from continued PT      Plan: Continue per plan of care  Progress treatment as tolerated         Precautions: na      Manuals 10/5 10/11 10/13 10/17 10/19 12/26 11/3 11/7   11/23 12/7   R midfoot, achilles, plantar fascia 5' [address] GH          Iastm 10' [address] [facility] [address] [facility] [place] JL ND JL 1720 [facility]  JL JL                             Neuro Re-Ed             Toe splay, toe yoga, arch doming             Ankle TB x4             1/2 roll gastroc stretch 10:x10 [address] [facility] [address] GH  ND JL GH ND JL JL   HR eccentrics             Biodex  5' [address] [facility] [place] JL  JL 1720 [facility] [place] ND JL    Arch doming on airex             [place] side walk with handle    10# 5x ea  JL GH 2x20 ft 10# 10x ea JL   Ther Ex             Bike L3 8' 10' L1 GH JL ND JL GH ND JL JL   Leg press DL 3x10 95# GH GH JL ND JL GH ND JL JL   Bridge    SL 10x ea         BOSU squats 2x10 GH 2x10 1x5 2x10 2x10 2x10 GH ND JL JL   TB side stepping GTB 3x GTB x4 GH          [place] Step + press with bar    16# 10x ND    16# 10x    BOSU single leg taps F, L, B      5x ea GH  JL JL                Ther Activity                                       Gait Training                                       Modalities

## 2022-12-14 ENCOUNTER — OFFICE VISIT (OUTPATIENT)
Dept: PHYSICAL THERAPY | Facility: CLINIC | Age: 27
End: 2022-12-14

## 2022-12-14 DIAGNOSIS — M72.2 PLANTAR FASCIITIS, RIGHT: ICD-10-CM

## 2022-12-14 DIAGNOSIS — M76.61 RIGHT ACHILLES TENDINITIS: Primary | ICD-10-CM

## 2022-12-14 NOTE — PROGRESS NOTES
Daily Note     Today's date: 2022  Patient name: Deann Hall  : 1995  MRN: [id number]  Referring provider: Trevor Cantrell DPM  Dx:   Encounter Diagnosis     ICD-10-CM    1  Right Achilles tendinitis  M76 61       2  Plantar fasciitis, right  M72 2                      Subjective: Notices walking much better in recent weeks      Objective: See treatment diary below      Assessment: Tolerated treatment well  Patient demonstrated fatigue post treatment, exhibited good technique with therapeutic exercises and would benefit from continued PT      Plan: Continue per plan of care  Progress treatment as tolerated         Precautions: na      Manuals 10/5 10/11 10/13 10/17 10/19 12/26 11/3 11/7   11/23 12/14   R midfoot, achilles, plantar fascia 5' [address] GH          Iastm 10' [address] [place] JL ND JL [facility] [place]  JL JL                             Neuro Re-Ed             Toe splay, toe yoga, arch doming             Ankle TB x4             1/2 roll gastroc stretch 10:x10 [address] GH  ND JL GH ND JL JL   HR eccentrics             Biodex  5' [address] [place] JL  JL [address] [facility] [place] ND JL    Arch doming on airex             [place] side walk with handle    10# 5x ea  JL GH 2x20 ft 10# 10x ea JL   Ther Ex             Bike L3 8' 10' L1 GH JL ND JL GH ND JL JL   Leg press DL 3x10 95# GH GH JL ND JL GH ND JL JL   Bridge    SL 10x ea         BOSU squats 2x10 GH 2x10 1x5 2x10 2x10 2x10 GH ND JL JL   TB side stepping GTB 3x GTB x4 GH          [place] Step + press with bar    16# 10x ND    16# 10x    BOSU single leg taps F, L, B      5x ea GH  JL JL                Ther Activity                                       Gait Training                                       Modalities

## 2022-12-21 ENCOUNTER — OFFICE VISIT (OUTPATIENT)
Dept: PHYSICAL THERAPY | Facility: CLINIC | Age: 27
End: 2022-12-21

## 2022-12-21 DIAGNOSIS — M72.2 PLANTAR FASCIITIS, RIGHT: ICD-10-CM

## 2022-12-21 DIAGNOSIS — M76.61 RIGHT ACHILLES TENDINITIS: Primary | ICD-10-CM

## 2022-12-21 NOTE — PROGRESS NOTES
Daily Note     Today's date: 2022  Patient name: Marina Lloyd  : 1995  MRN: [id number]  Referring provider: Courtney Santos DPM  Dx:   Encounter Diagnosis     ICD-10-CM    1  Right Achilles tendinitis  M76 61       2  Plantar fasciitis, right  M72 2                      Subjective: Feeling better week by week  Objective: See treatment diary below      Assessment: Tolerated treatment well  Patient demonstrated fatigue post treatment, exhibited good technique with therapeutic exercises and would benefit from continued PT      Plan: Continue per plan of care  Progress treatment as tolerated         Precautions: na      Manuals 10/5 10/11 10/13 10/17 10/19 12/26 11/3 11/7   11/23 12/21   R midfoot, achilles, plantar fascia 5' [facility] GH          Iastm 10' [facility] JL ND JL [facility]  JL JL                             Neuro Re-Ed             Toe splay, toe yoga, arch doming             Ankle TB x4             1/2 roll gastroc stretch 10:x10 [facility] GH  ND JL GH ND JL JL   HR eccentrics             Biodex  5' [facility] JL  JL [facility] ND JL    Arch doming on airex             [place] side walk with handle    10# 5x ea  JL GH 2x20 ft 10# 10x ea JL   Ther Ex             Bike L3 8' 10' L1 GH JL ND JL GH ND JL JL   Leg press DL 3x10 95# GH GH JL ND JL GH ND JL JL   Bridge    SL 10x ea         BOSU squats 2x10 GH 2x10 1x5 2x10 2x10 2x10 GH ND JL JL   TB side stepping GTB 3x GTB x4 GH          [place] Step + press with bar    16# 10x ND    16# 10x    BOSU single leg taps F, L, B      5x ea GH  JL JL                Ther Activity                                       Gait Training                                       Modalities

## 2022-12-28 ENCOUNTER — OFFICE VISIT (OUTPATIENT)
Dept: PHYSICAL THERAPY | Facility: CLINIC | Age: 27
End: 2022-12-28

## 2022-12-28 DIAGNOSIS — M72.2 PLANTAR FASCIITIS, RIGHT: ICD-10-CM

## 2022-12-28 DIAGNOSIS — M76.61 RIGHT ACHILLES TENDINITIS: Primary | ICD-10-CM

## 2022-12-28 NOTE — PROGRESS NOTES
Daily Note     Today's date: 2022  Patient name: Daisha Flynn  : 1995  MRN: [id number]  Referring provider: No Conde DPM  Dx:   Encounter Diagnosis     ICD-10-CM    1  Right Achilles tendinitis  M76 61       2  Plantar fasciitis, right  M72 2                      Subjective: Continues to improve  Tapering down to PT once every other week  Objective: See treatment diary below      Assessment: Tolerated treatment well  Patient exhibited good technique with therapeutic exercises and would benefit from continued PT      Plan: Continue per plan of care  Progress treatment as tolerated         Precautions: na      Manuals 10/5 10/11 10/13 10/17 10/19 12/26 11/3 11/7   11/23 12/28   R midfoot, achilles, plantar fascia 5' [address] GH          Iastm 10' [address] [place] JL ND JL [facility] [place]  JL JL                             Neuro Re-Ed             Toe splay, toe yoga, arch doming             Ankle TB x4             1/2 roll gastroc stretch 10:x10 [address] GH  ND JL GH ND JL JL   HR eccentrics             Biodex  5' [address] JL  JL [address] ND JL    Arch doming on airex             [place] side walk with handle    10# 5x ea  JL GH 2x20 ft 10# 10x ea JL   Ther Ex             Bike L3 8' 10' L1 GH JL ND JL GH ND JL JL   Leg press DL 3x10 95# GH GH JL ND JL GH ND JL JL   Bridge    SL 10x ea         BOSU squats 2x10 GH 2x10 1x5 2x10 2x10 2x10 GH ND JL JL   TB side stepping GTB 3x GTB x4 GH          [place] Step + press with bar    16# 10x ND    16# 10x    BOSU single leg taps F, L, B      5x ea GH  JL JL                Ther Activity                                       Gait Training                                       Modalities

## 2023-01-11 ENCOUNTER — OFFICE VISIT (OUTPATIENT)
Dept: PHYSICAL THERAPY | Facility: CLINIC | Age: 28
End: 2023-01-11

## 2023-01-11 DIAGNOSIS — M72.2 PLANTAR FASCIITIS, RIGHT: ICD-10-CM

## 2023-01-11 DIAGNOSIS — M76.61 RIGHT ACHILLES TENDINITIS: Primary | ICD-10-CM

## 2023-01-12 NOTE — PROGRESS NOTES
Daily Note     Today's date: 2023  Patient name: Chetan Pascual  : 1995  MRN: [id number]  Referring provider: Katya Bhatti DPM  Dx:   Encounter Diagnosis     ICD-10-CM    1  Right Achilles tendinitis  M76 61       2  Plantar fasciitis, right  M72 2                      Subjective: Noticing some increase in limping with reduction in PT visits to every other week  Objective: See treatment diary below      Assessment: Tolerated treatment well and feeling better following session, she and family memebers believe the IASTM is most effective intervention  Patient exhibited good technique with therapeutic exercises and would benefit from continued PT      Plan: Continue per plan of care        Precautions: na      Manuals 10/5 10/11 10/13 10/17 10/19 12/26 11/3 11/7   11/23 1/11   R midfoot, achilles, plantar fascia 5' [address] GH          Iastm 10' [address] JL ND JL [address]  JL JL                             Neuro Re-Ed             Toe splay, toe yoga, arch doming             Ankle TB x4             1/2 roll gastroc stretch 10:x10 [address] GH  ND JL GH ND JL JL   HR eccentrics             Biodex  5' [address] JL  JL [address] ND JL    Arch doming on airex             [place] side walk with handle    10# 5x ea  JL GH 2x20 ft 10# 10x ea JL   Ther Ex             Bike L3 8' 10' L1 GH JL ND JL GH ND JL JL   Leg press DL 3x10 95# GH GH JL ND JL GH ND JL JL   Bridge    SL 10x ea         BOSU squats 2x10 GH 2x10 1x5 2x10 2x10 2x10 GH ND JL JL   TB side stepping GTB 3x GTB x4 GH          [place] Step + press with bar    16# 10x ND    16# 10x    BOSU single leg taps F, L, B      5x ea GH  JL JL                Ther Activity                                       Gait Training                                       Modalities

## 2023-01-23 ENCOUNTER — OFFICE VISIT (OUTPATIENT)
Dept: PHYSICAL THERAPY | Facility: CLINIC | Age: 28
End: 2023-01-23

## 2023-01-23 DIAGNOSIS — M76.61 RIGHT ACHILLES TENDINITIS: Primary | ICD-10-CM

## 2023-01-23 DIAGNOSIS — M72.2 PLANTAR FASCIITIS, RIGHT: ICD-10-CM

## 2023-01-23 NOTE — PROGRESS NOTES
Daily Note     Today's date: 2023  Patient name: Mis Armas  : 1995  MRN: [id number]  Referring provider: Joo Donald DPM  Dx:   Encounter Diagnosis     ICD-10-CM    1  Right Achilles tendinitis  M76 61       2  Plantar fasciitis, right  M72 2                      Subjective: Continuing to do well  Foot seems to do worse on days of high activity with a lot of standing/walking        Objective: See treatment diary below      Assessment: Tolerated treatment well  Patient exhibited good technique with therapeutic exercises and would benefit from continued PT      Plan: Continue per plan of care    1x every other week, IAS primary focus     Precautions: na      Manuals 10/5 10/11 10/13 10/17 10/19 12/26 11/3 11/7   11/23 1/23   R midfoot, achilles, plantar fascia 5' [address]           Iastm 10' [address] JL ND JL [address]  JL JL                             Neuro Re-Ed             Toe splay, toe yoga, arch doming             Ankle TB x4             1/2 roll gastroc stretch 10:x10 [address] GH  ND JL GH ND JL JL   HR eccentrics             Biodex  5' [address] JL  JL [address] ND JL    Arch doming on airex             [place] side walk with handle    10# 5x ea  JL GH 2x20 ft 10# 10x ea JL   Ther Ex             Bike L3 8' 10' L1 GH JL ND JL GH ND JL JL   Leg press DL 3x10 95# GH GH JL ND JL GH ND JL JL   Bridge    SL 10x ea         BOSU squats 2x10 GH 2x10 1x5 2x10 2x10 2x10 GH ND JL JL   TB side stepping GTB 3x GTB x4 GH          [place] Step + press with bar    16# 10x ND    16# 10x    BOSU single leg taps F, L, B      5x ea GH  JL JL                Ther Activity                                       Gait Training                                       Modalities

## 2023-02-06 ENCOUNTER — OFFICE VISIT (OUTPATIENT)
Dept: PHYSICAL THERAPY | Facility: CLINIC | Age: 28
End: 2023-02-06

## 2023-02-06 DIAGNOSIS — M76.61 RIGHT ACHILLES TENDINITIS: Primary | ICD-10-CM

## 2023-02-06 DIAGNOSIS — M72.2 PLANTAR FASCIITIS, RIGHT: ICD-10-CM

## 2023-02-06 NOTE — PROGRESS NOTES
Daily Note     Today's date: 2023  Patient name: Ita Crawford  : 1995  MRN: [id number]  Referring provider: Merlinda Bari, DPM  Dx:   Encounter Diagnosis     ICD-10-CM    1  Right Achilles tendinitis  M76 61       2  Plantar fasciitis, right  M72 2                      Subjective: Overall doing well  Parents notice some occasional limping but no toe walking      Objective: See treatment diary below      Assessment: Tolerated treatment well and progressed weight shifting to include squats on biodex    Patient demonstrated fatigue post treatment and would benefit from continued PT      Plan: Continue per plan of care  Progress treatment as tolerated         Precautions: na      Manuals 10/5 10/11 10/13 10/17 10/19 12/26 11/3 11/7   11/23 2/6   R midfoot, achilles, plantar fascia 5' [facility] GH          Iastm 10' [facility] JL ND JL [facility]  JL JL                             Neuro Re-Ed             Toe splay, toe yoga, arch doming             Ankle TB x4             1/2 roll gastroc stretch 10:x10 [facility] GH  ND JL GH ND JL JL   HR eccentrics             Biodex  5' [facility] JL  JL GH ND JL squats 2x10   Arch doming on airex             [place] side walk with handle    10# 5x ea  JL GH 2x20 ft 10# 10x ea JL   Ther Ex             Bike L3 8' 10' L1 GH JL ND JL GH ND JL JL   Leg press DL 3x10 95# GH GH JL ND JL GH ND JL JL   Bridge    SL 10x ea         BOSU squats 2x10 GH 2x10 1x5 2x10 2x10 2x10 GH ND JL JL   TB side stepping GTB 3x GTB x4 GH          [place] Step + press with bar    16# 10x ND    16# 10x    BOSU single leg taps F, L, B      5x ea GH  JL JL                Ther Activity                                       Gait Training                                       Modalities

## 2023-02-20 ENCOUNTER — OFFICE VISIT (OUTPATIENT)
Dept: PHYSICAL THERAPY | Facility: CLINIC | Age: 28
End: 2023-02-20

## 2023-02-20 DIAGNOSIS — M76.61 RIGHT ACHILLES TENDINITIS: Primary | ICD-10-CM

## 2023-02-20 DIAGNOSIS — M72.2 PLANTAR FASCIITIS, RIGHT: ICD-10-CM

## 2023-02-20 NOTE — PROGRESS NOTES
Daily Note     Today's date: 2023  Patient name: Carole Doyle  : 1995  MRN: [id number]  Referring provider: Patti Carrington DPM  Dx:   Encounter Diagnosis     ICD-10-CM    1  Right Achilles tendinitis  M76 61       2  Plantar fasciitis, right  M72 2                      Subjective: Still seeing benefit from maintenance PT for her foot pain  Parents plan to resume core strengthening at home to improve lumbar lordosis      Objective: See treatment diary below      Assessment: Tolerated treatment well  Patient exhibited good technique with therapeutic exercises and would benefit from continued PT      Plan: Continue per plan of care        Precautions: na      Manuals 10/5 10/11 10/13 10/17 10/19 12/26 11/3 11/7   11/23 2/20   R midfoot, achilles, plantar fascia 5' [facility] GH          Iastm 10' [facility] JL ND JL [facility]  JL JL                             Neuro Re-Ed             Toe splay, toe yoga, arch doming             Ankle TB x4             1/2 roll gastroc stretch 10:x10 [facility] GH  ND JL GH ND JL JL   HR eccentrics             Biodex  5' [facility] JL  JL GH ND JL squats 2x10   Arch doming on airex             [place] side walk with handle    10# 5x ea  JL GH 2x20 ft 10# 10x ea JL   Ther Ex             Bike L3 8' 10' L1 GH JL ND JL GH ND JL JL   Leg press DL 3x10 95# GH GH JL ND JL GH ND JL JL   Bridge    SL 10x ea         BOSU squats 2x10 GH 2x10 1x5 2x10 2x10 2x10 GH ND JL JL   TB side stepping GTB 3x GTB x4 GH          [place] Step + press with bar    16# 10x ND    16# 10x 20# 2x10   BOSU single leg taps F, L, B      5x ea GH  JL JL                Ther Activity                                       Gait Training                                       Modalities

## 2023-03-06 ENCOUNTER — OFFICE VISIT (OUTPATIENT)
Dept: PHYSICAL THERAPY | Facility: CLINIC | Age: 28
End: 2023-03-06

## 2023-03-06 DIAGNOSIS — M76.61 RIGHT ACHILLES TENDINITIS: Primary | ICD-10-CM

## 2023-03-06 DIAGNOSIS — M72.2 PLANTAR FASCIITIS, RIGHT: ICD-10-CM

## 2023-03-06 NOTE — PROGRESS NOTES
Daily Note     Today's date: 3/6/2023  Patient name: Frankey Blas  : 1995  MRN: [id number]  Referring provider: Ricarda Ghosh DPM  Dx:   Encounter Diagnosis     ICD-10-CM    1  Right Achilles tendinitis  M76 61       2  Plantar fasciitis, right  M72 2                      Subjective: Sometimes notices her foot/limping after basketball practice but otherwise feeling pretty good  Objective: See treatment diary below      Assessment: Tolerated treatment well and working on lumbar HEP, balance and weight shifting significantly improved  Patient exhibited good technique with therapeutic exercises and would benefit from continued PT      Plan: Continue per plan of care        Precautions: na      Manuals 10/5 10/11 10/13 10/17 10/19 12/26 11/3 11/7   11/23 3/6   R midfoot, achilles, plantar fascia 5' [facility] GH          Iastm 10' [facility] JL ND JL [facility]  JL                              Neuro Re-Ed             Toe splay, toe yoga, arch doming             Ankle TB x4             1/2 roll gastroc stretch 10:x10 [facility] GH  ND JL GH ND JL JL   HR eccentrics             Biodex  5' [facility] JL  JL GH ND JL squats 2x10   Arch doming on airex             [place] side walk with handle    10# 5x ea  JL GH 2x20 ft 10# 10x ea JL   Ther Ex             Bike L3 8' 10' L1 GH JL ND JL GH ND JL JL   Leg press DL 3x10 95# GH GH JL ND JL GH ND JL JL   Bridge    SL 10x ea         BOSU squats 2x10 GH 2x10 1x5 2x10 2x10 2x10 GH ND JL JL   TB side stepping GTB 3x GTB x4 GH          [place] Step + press with bar    16# 10x ND    16# 10x 20# 2x10   BOSU single leg taps F, L, B      5x ea GH  JL JL                Ther Activity                                       Gait Training                                       Modalities

## 2023-03-20 ENCOUNTER — OFFICE VISIT (OUTPATIENT)
Dept: PHYSICAL THERAPY | Facility: CLINIC | Age: 28
End: 2023-03-20

## 2023-03-20 DIAGNOSIS — M76.61 RIGHT ACHILLES TENDINITIS: Primary | ICD-10-CM

## 2023-03-20 DIAGNOSIS — M72.2 PLANTAR FASCIITIS, RIGHT: ICD-10-CM

## 2023-03-20 NOTE — PROGRESS NOTES
Daily Note     Today's date: 3/20/2023  Patient name: Sabi Dean  : 1995  MRN: [id number]  Referring provider: Abisai Rodriguez DPM  Dx:   Encounter Diagnosis     ICD-10-CM    1  Right Achilles tendinitis  M76 61       2  Plantar fasciitis, right  M72 2                      Subjective: Higinio continues to demonstrate some intermittent limping depending on her activity level  Objective: See treatment diary below      Assessment: Tolerated treatment well and IASTM improves weight bearing tolerance, demonstrates improved ability to weight shift through movement on biodex  Patient exhibited good technique with therapeutic exercises and would benefit from continued PT      Plan: Continue per plan of care        Precautions: na      Manuals 10/5 10/11 10/13 10/17 10/19 12/26 11/3 11/7   11/23 3/20   R midfoot, achilles, plantar fascia 5' [address] GH          Iastm 10' [address] JL ND JL [address]  JL JL                             Neuro Re-Ed             Toe splay, toe yoga, arch doming             Ankle TB x4             1/2 roll gastroc stretch 10:x10 [address] GH  ND JL GH ND JL JL   HR eccentrics             Biodex  5' [address] JL  JL GH ND JL squats 2x10   Arch doming on airex             [place] side walk with handle    10# 5x ea  JL GH 2x20 ft 10# 10x ea JL   Ther Ex             Bike L3 8' 10' L1 GH JL ND JL GH ND JL JL   Leg press DL 3x10 95# GH GH JL ND JL GH ND JL JL   Bridge    SL 10x ea         BOSU squats 2x10 GH 2x10 1x5 2x10 2x10 2x10 GH ND JL JL   TB side stepping GTB 3x GTB x4 GH          [place] Step + press with bar    16# 10x ND    16# 10x 20# 2x10   BOSU single leg taps F, L, B      5x ea GH  JL JL                Ther Activity                                       Gait Training                                       Modalities

## 2023-04-03 ENCOUNTER — APPOINTMENT (OUTPATIENT)
Dept: PHYSICAL THERAPY | Facility: CLINIC | Age: 28
End: 2023-04-03

## 2023-04-05 ENCOUNTER — OFFICE VISIT (OUTPATIENT)
Dept: PHYSICAL THERAPY | Facility: CLINIC | Age: 28
End: 2023-04-05

## 2023-04-05 DIAGNOSIS — M76.61 RIGHT ACHILLES TENDINITIS: Primary | ICD-10-CM

## 2023-04-05 DIAGNOSIS — M72.2 PLANTAR FASCIITIS, RIGHT: ICD-10-CM

## 2023-04-05 NOTE — PROGRESS NOTES
Daily Note     Today's date: 2023  Patient name: Mraisa Vital  : 1995  MRN: [id number]  Referring provider: Ese Harris DPM  Dx:   Encounter Diagnosis     ICD-10-CM    1  Right Achilles tendinitis  M76 61       2  Plantar fasciitis, right  M72 2                      Subjective: Overall feeling pretty good  She has basketball practice tonight and requests lighter focus on strengthening today        Objective: See treatment diary below      Assessment: Tolerated treatment well  Patient demonstrated fatigue post treatment, exhibited good technique with therapeutic exercises and would benefit from continued PT      Plan: Continue per plan of care        Precautions: na      Manuals 10/5 10/11 10/13 10/17 10/19 12/26 11/3 11/7   11/23 4/5   R midfoot, achilles, plantar fascia 5' [address] GH          Iastm 10' [address] [place] JL ND JL [facility] [place]  JL JL                             Neuro Re-Ed             Toe splay, toe yoga, arch doming             Ankle TB x4             1/2 roll gastroc stretch 10:x10 [address] [place] GH  ND JL GH ND JL    HR eccentrics             Biodex  5' [address] JL  JL GH ND JL squats 2x10   Arch doming on airex             [place] side walk with handle    10# 5x ea  JL GH 2x20 ft 10# 10x ea    Ther Ex             Bike L3 8' 10' L1 GH JL ND JL [address] [place] ND JL JL   Leg press DL 3x10 95# [address] GH JL ND JL GH ND JL    Bridge    SL 10x ea         BOSU squats 2x10 GH 2x10 1x5 2x10 2x10 2x10 GH ND JL JL   TB side stepping GTB 3x GTB x4 GH          [place] Step + press with bar    16# 10x ND    16# 10x    BOSU single leg taps F, L, B      5x ea GH  JL                 Ther Activity                                       Gait Training                                       Modalities